# Patient Record
Sex: FEMALE | Race: WHITE | NOT HISPANIC OR LATINO | Employment: FULL TIME | URBAN - METROPOLITAN AREA
[De-identification: names, ages, dates, MRNs, and addresses within clinical notes are randomized per-mention and may not be internally consistent; named-entity substitution may affect disease eponyms.]

---

## 2017-06-01 ENCOUNTER — PATIENT MESSAGE (OUTPATIENT)
Dept: OBSTETRICS AND GYNECOLOGY | Facility: CLINIC | Age: 32
End: 2017-06-01

## 2017-06-01 DIAGNOSIS — Z36.89 ESTABLISH GESTATIONAL AGE, ULTRASOUND: Primary | ICD-10-CM

## 2017-06-05 ENCOUNTER — PATIENT MESSAGE (OUTPATIENT)
Dept: OBSTETRICS AND GYNECOLOGY | Facility: CLINIC | Age: 32
End: 2017-06-05

## 2017-06-13 ENCOUNTER — TELEPHONE (OUTPATIENT)
Dept: OBSTETRICS AND GYNECOLOGY | Facility: CLINIC | Age: 32
End: 2017-06-13

## 2017-06-13 NOTE — TELEPHONE ENCOUNTER
----- Message from Enrique Ojeda MA sent at 6/6/2017  1:42 PM CDT -----  Regarding: GYNUS 7/3/2017  SCHEDULE PT FOR GYNUS FIRST WEEK OF July. PT HAS APPT 7-3-17.

## 2017-06-15 ENCOUNTER — PATIENT MESSAGE (OUTPATIENT)
Dept: OBSTETRICS AND GYNECOLOGY | Facility: CLINIC | Age: 32
End: 2017-06-15

## 2017-06-16 ENCOUNTER — TELEPHONE (OUTPATIENT)
Dept: OBSTETRICS AND GYNECOLOGY | Facility: CLINIC | Age: 32
End: 2017-06-16

## 2017-06-16 ENCOUNTER — PATIENT MESSAGE (OUTPATIENT)
Dept: INTERNAL MEDICINE | Facility: CLINIC | Age: 32
End: 2017-06-16

## 2017-06-16 DIAGNOSIS — R51.9 HEADACHE, UNSPECIFIED HEADACHE TYPE: Primary | ICD-10-CM

## 2017-06-16 RX ORDER — BUTALBITAL, ACETAMINOPHEN AND CAFFEINE 50; 325; 40 MG/1; MG/1; MG/1
1 TABLET ORAL EVERY 4 HOURS PRN
Qty: 30 TABLET | Refills: 0 | Status: SHIPPED | OUTPATIENT
Start: 2017-06-16 | End: 2017-07-16

## 2017-06-16 RX ORDER — BUTALBITAL, ACETAMINOPHEN AND CAFFEINE 50; 325; 40 MG/1; MG/1; MG/1
1 TABLET ORAL EVERY 4 HOURS PRN
Qty: 30 TABLET | Refills: 3 | Status: SHIPPED | OUTPATIENT
Start: 2017-06-16 | End: 2017-07-05 | Stop reason: SDUPTHER

## 2017-06-16 NOTE — TELEPHONE ENCOUNTER
please review message below!  Pt requesting Fioricet be sent to her pharmacy. NEWLY pregnant - has not been seen for this pregnancy as of yet: 6 weeks today    yes i dont care who the appointment is with or what day or what location. i just need to get in the latest time of day possible. is there anyway you could see if she could write me for fioricet for headaches. i had to get it last time i was pregnant and i already feel them coming on.    thank you so much. i owe you big time.  jose c

## 2017-07-05 ENCOUNTER — LAB VISIT (OUTPATIENT)
Dept: LAB | Facility: OTHER | Age: 32
End: 2017-07-05
Attending: ADVANCED PRACTICE MIDWIFE
Payer: COMMERCIAL

## 2017-07-05 ENCOUNTER — OFFICE VISIT (OUTPATIENT)
Dept: OBSTETRICS AND GYNECOLOGY | Facility: CLINIC | Age: 32
End: 2017-07-05
Payer: COMMERCIAL

## 2017-07-05 ENCOUNTER — PROCEDURE VISIT (OUTPATIENT)
Dept: OBSTETRICS AND GYNECOLOGY | Facility: CLINIC | Age: 32
End: 2017-07-05
Payer: COMMERCIAL

## 2017-07-05 VITALS
DIASTOLIC BLOOD PRESSURE: 62 MMHG | BODY MASS INDEX: 22.79 KG/M2 | HEIGHT: 68 IN | WEIGHT: 150.38 LBS | SYSTOLIC BLOOD PRESSURE: 104 MMHG

## 2017-07-05 DIAGNOSIS — N91.5 OLIGOMENORRHEA: Primary | ICD-10-CM

## 2017-07-05 DIAGNOSIS — Z36.89 ESTABLISH GESTATIONAL AGE, ULTRASOUND: ICD-10-CM

## 2017-07-05 DIAGNOSIS — N91.5 OLIGOMENORRHEA: ICD-10-CM

## 2017-07-05 DIAGNOSIS — N91.2 AMENORRHEA: ICD-10-CM

## 2017-07-05 DIAGNOSIS — R51.9 HEADACHE, UNSPECIFIED HEADACHE TYPE: ICD-10-CM

## 2017-07-05 DIAGNOSIS — Z12.4 PAP SMEAR FOR CERVICAL CANCER SCREENING: ICD-10-CM

## 2017-07-05 DIAGNOSIS — Z11.51 SCREENING FOR HUMAN PAPILLOMAVIRUS (HPV): ICD-10-CM

## 2017-07-05 LAB
ABO + RH BLD: NORMAL
BASOPHILS # BLD AUTO: 0.01 K/UL
BASOPHILS NFR BLD: 0.1 %
BLD GP AB SCN CELLS X3 SERPL QL: NORMAL
C TRACH DNA SPEC QL NAA+PROBE: NOT DETECTED
DIFFERENTIAL METHOD: ABNORMAL
EOSINOPHIL # BLD AUTO: 0 K/UL
EOSINOPHIL NFR BLD: 0.4 %
ERYTHROCYTE [DISTWIDTH] IN BLOOD BY AUTOMATED COUNT: 13.7 %
HCT VFR BLD AUTO: 38.1 %
HGB BLD-MCNC: 12.8 G/DL
LYMPHOCYTES # BLD AUTO: 2.3 K/UL
LYMPHOCYTES NFR BLD: 27.3 %
MCH RBC QN AUTO: 29.4 PG
MCHC RBC AUTO-ENTMCNC: 33.6 %
MCV RBC AUTO: 88 FL
MONOCYTES # BLD AUTO: 0.7 K/UL
MONOCYTES NFR BLD: 8.3 %
N GONORRHOEA DNA SPEC QL NAA+PROBE: NOT DETECTED
NEUTROPHILS # BLD AUTO: 5.4 K/UL
NEUTROPHILS NFR BLD: 63.5 %
PLATELET # BLD AUTO: 265 K/UL
PMV BLD AUTO: 9 FL
RBC # BLD AUTO: 4.35 M/UL
WBC # BLD AUTO: 8.53 K/UL

## 2017-07-05 PROCEDURE — 87591 N.GONORRHOEAE DNA AMP PROB: CPT

## 2017-07-05 PROCEDURE — 36415 COLL VENOUS BLD VENIPUNCTURE: CPT

## 2017-07-05 PROCEDURE — 87624 HPV HI-RISK TYP POOLED RSLT: CPT

## 2017-07-05 PROCEDURE — 81220 CFTR GENE COM VARIANTS: CPT

## 2017-07-05 PROCEDURE — 99214 OFFICE O/P EST MOD 30 MIN: CPT | Mod: S$PBB,,, | Performed by: NURSE PRACTITIONER

## 2017-07-05 PROCEDURE — 86850 RBC ANTIBODY SCREEN: CPT

## 2017-07-05 PROCEDURE — 86900 BLOOD TYPING SEROLOGIC ABO: CPT

## 2017-07-05 PROCEDURE — 76817 TRANSVAGINAL US OBSTETRIC: CPT | Mod: PBBFAC | Performed by: OBSTETRICS & GYNECOLOGY

## 2017-07-05 PROCEDURE — 86762 RUBELLA ANTIBODY: CPT

## 2017-07-05 PROCEDURE — 86703 HIV-1/HIV-2 1 RESULT ANTBDY: CPT

## 2017-07-05 PROCEDURE — 85025 COMPLETE CBC W/AUTO DIFF WBC: CPT

## 2017-07-05 PROCEDURE — 88175 CYTOPATH C/V AUTO FLUID REDO: CPT

## 2017-07-05 PROCEDURE — 86592 SYPHILIS TEST NON-TREP QUAL: CPT

## 2017-07-05 PROCEDURE — 99999 PR PBB SHADOW E&M-EST. PATIENT-LVL III: CPT | Mod: PBBFAC,,, | Performed by: NURSE PRACTITIONER

## 2017-07-05 PROCEDURE — 87086 URINE CULTURE/COLONY COUNT: CPT

## 2017-07-05 PROCEDURE — 87340 HEPATITIS B SURFACE AG IA: CPT

## 2017-07-05 PROCEDURE — 76817 TRANSVAGINAL US OBSTETRIC: CPT | Mod: 26,S$PBB,, | Performed by: OBSTETRICS & GYNECOLOGY

## 2017-07-05 PROCEDURE — 99213 OFFICE O/P EST LOW 20 MIN: CPT | Mod: PBBFAC | Performed by: NURSE PRACTITIONER

## 2017-07-05 RX ORDER — BUTALBITAL, ACETAMINOPHEN AND CAFFEINE 50; 325; 40 MG/1; MG/1; MG/1
1 TABLET ORAL EVERY 4 HOURS PRN
Qty: 30 TABLET | Refills: 0 | Status: SHIPPED | OUTPATIENT
Start: 2017-07-05 | End: 2017-08-04

## 2017-07-05 NOTE — PROCEDURES
Procedures   Obstetrical ultrasound completed today.  See report in imaging section of [facility].

## 2017-07-05 NOTE — PROGRESS NOTES
"  CC: Positive Pregnancy Test    HISTORY OF PRESENT ILLNESS:    Jodi Daniel is a 31 y.o. female, ,  Presents today for a routine exam complaining of amenorrhea and positive home urine pregnancy test.  Patient's last menstrual period was 2017 (exact date).   She is not currently on any contraception.  Denies nausea. Reports breast tenderness. Denies vaginal bleeding and pelvic pain.  Reports HAs- uses Fioricet PRN.   Prior  X1- full term/ uncomplicated pregnancy.  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        ROS:  GENERAL: No weight changes. No swelling. No fatigue. No fever.  CARDIOVASCULAR: No chest pain. No shortness of breath. No leg cramps.   NEUROLOGICAL: No headaches. No vision changes.  BREASTS: No pain. No lumps. No discharge.  ABDOMEN: No pain. No diarrhea. No constipation.  REPRODUCTIVE: No abnormal bleeding.   VULVA: No pain. No lesions. No itching.  VAGINA: No relaxation. No itching. No odor. No discharge. No lesions.  URINARY: No incontinence. No nocturia. No frequency. No dysuria.    MEDICATIONS AND ALLERGIES:  Reviewed        COMPREHENSIVE GYN HISTORY:  PAP History: Denies abnormal Paps.  Infection History: Denies STDs. Denies PID.  Benign History: Denies uterine fibroids. Denies ovarian cysts. Denies endometriosis. Denies other conditions.  Cancer History: Denies cervical cancer. Denies uterine cancer or hyperplasia. Denies ovarian cancer. Denies vulvar cancer or pre-cancer. Denies vaginal cancer or pre-cancer. Denies breast cancer. Denies colon cancer.  Sexual Activity History: Reports currently being sexually active  Menstrual History: None.  Contraception: None    /62   Ht 5' 8" (1.727 m)   Wt 68.2 kg (150 lb 5.7 oz)   LMP 2017 (Exact Date)   BMI 22.86 kg/m²     PE:  AFFECT: Calm, alert and oriented X 3. Interactive during exam  GENERAL: Appears well-nourished, well-developed, in no acute distress.  HEAD: Normocephalic, atruamatic  TEETH: Good dentition.  THYROID: No " thyromegally   BREASTS: No masses, skin changes, nipple discharge or adenopathy bilaterally.  SKIN: Normal for race, warm, & dry. No lesions or rashes.  LUNGS: Easy and unlabored, clear to auscultation bilaterally.  HEART: Regular rate and rhythm   ABDOMEN: Soft and nontender without masses or organomegally.  VULVA: No lesions, masses or tenderness.  VAGINA: Moist and well rugated without lesions or discharge.  CERVIX: Moist and pink without lesions, discharge or tenderness.      UTERUS SIZE: 8 week size, nontender and without masses.  ADNEXA: No masses or tenderness.  ESTIMATE OF PELVIC CAPACITY: Adequate  EXTREMITIES: No cyanosis, clubbing or edema. No calf tenderness.  LYMPH NODES: No axillary or inguinal adenopathy.    PROCEDURES:  UPT Positive  Genprobe  Pap      ASSESSMENT/PLAN:  Amenorrhea  Positive urine pregnancy test (ARIANNE: 18, EGA: 8w6d based on LMP)    -  Routine prenatal care    Nausea and vomiting in pregnancy    -  Education regarding lifestyle and dietary modifications    -  Advised use of B6/Unisom. Pt will notify us if no relief/worsening symptoms, will consider Zofran if needed.      1st TRIMESTER COUNSELING: Discussed all, booklet provided:  Common complaints of pregnancy  HIV and other routine prenatal tests including  genetic screening  Risk factors identified by prenatal history  Oriented to practice - discussed anticipated course of prenatal care & indications for Ultrasound  Childbirth classes/Hospital facilities   Nutrition and weight gain counseling  Toxoplasmosis precautions (Cats/Raw Meat)  Sexual activity and exercise  Environmental/Work hazards  Travel  Tobacco (Ask, Advise, Assess, Assist, and Arrange), as well as alcohol and drug use  Use of any medications (Including supplements, Vitamins, Herbs, or OTC Drugs)  Domestic violence  Seat belt use      TERATOLOGY COUNSELING:   Discussed indications and options for aneuploidy screening - pamphlets given    -  Pt declines  testing  Dating US later today   FOLLOW-UP in 4 weeks with Dr. Katie Weiss, NP    OB/GYN

## 2017-07-06 LAB
BACTERIA UR CULT: NO GROWTH
HBV SURFACE AG SERPL QL IA: NEGATIVE
HIV 1+2 AB+HIV1 P24 AG SERPL QL IA: NEGATIVE
RPR SER QL: NORMAL

## 2017-07-07 ENCOUNTER — PATIENT MESSAGE (OUTPATIENT)
Dept: OBSTETRICS AND GYNECOLOGY | Facility: CLINIC | Age: 32
End: 2017-07-07

## 2017-07-07 LAB
RUBV IGG SER-ACNC: 42.7 IU/ML
RUBV IGG SER-IMP: REACTIVE

## 2017-07-11 LAB
HPV HR 12 DNA CVX QL NAA+PROBE: NEGATIVE
HPV16 DNA SPEC QL NAA+PROBE: NEGATIVE
HPV18 DNA SPEC QL NAA+PROBE: NEGATIVE

## 2017-07-14 LAB — CFTR MUT ANL BLD/T: NORMAL

## 2017-08-01 ENCOUNTER — ROUTINE PRENATAL (OUTPATIENT)
Dept: OBSTETRICS AND GYNECOLOGY | Facility: CLINIC | Age: 32
End: 2017-08-01
Payer: COMMERCIAL

## 2017-08-01 VITALS — SYSTOLIC BLOOD PRESSURE: 100 MMHG | BODY MASS INDEX: 24 KG/M2 | WEIGHT: 157.88 LBS | DIASTOLIC BLOOD PRESSURE: 66 MMHG

## 2017-08-01 DIAGNOSIS — Z3A.12 12 WEEKS GESTATION OF PREGNANCY: Primary | ICD-10-CM

## 2017-08-01 PROCEDURE — 99999 PR PBB SHADOW E&M-EST. PATIENT-LVL III: CPT | Mod: PBBFAC,,, | Performed by: OBSTETRICS & GYNECOLOGY

## 2017-08-01 PROCEDURE — 0502F SUBSEQUENT PRENATAL CARE: CPT | Mod: S$GLB,,, | Performed by: OBSTETRICS & GYNECOLOGY

## 2017-08-01 NOTE — PROGRESS NOTES
HERE for routine OB visit at 12 5/7 wks, with NO complaints.  Denies vaginal bleeding, cramping.

## 2017-08-08 ENCOUNTER — PATIENT MESSAGE (OUTPATIENT)
Dept: OBSTETRICS AND GYNECOLOGY | Facility: CLINIC | Age: 32
End: 2017-08-08

## 2017-08-08 ENCOUNTER — TELEPHONE (OUTPATIENT)
Dept: OBSTETRICS AND GYNECOLOGY | Facility: CLINIC | Age: 32
End: 2017-08-08

## 2017-08-08 DIAGNOSIS — Z3A.13 13 WEEKS GESTATION OF PREGNANCY: Primary | ICD-10-CM

## 2017-08-08 NOTE — TELEPHONE ENCOUNTER
please place order for anatomy scan:    Do you know when they are going to call me for the anatomy scan? Thank you! Just have to tell work asap

## 2017-08-31 ENCOUNTER — ROUTINE PRENATAL (OUTPATIENT)
Dept: OBSTETRICS AND GYNECOLOGY | Facility: CLINIC | Age: 32
End: 2017-08-31
Payer: COMMERCIAL

## 2017-08-31 VITALS — BODY MASS INDEX: 24.7 KG/M2 | DIASTOLIC BLOOD PRESSURE: 70 MMHG | SYSTOLIC BLOOD PRESSURE: 120 MMHG | WEIGHT: 162.5 LBS

## 2017-08-31 DIAGNOSIS — Z3A.17 17 WEEKS GESTATION OF PREGNANCY: Primary | ICD-10-CM

## 2017-08-31 PROCEDURE — 0502F SUBSEQUENT PRENATAL CARE: CPT | Mod: S$GLB,,, | Performed by: OBSTETRICS & GYNECOLOGY

## 2017-08-31 PROCEDURE — 99999 PR PBB SHADOW E&M-EST. PATIENT-LVL III: CPT | Mod: PBBFAC,,, | Performed by: OBSTETRICS & GYNECOLOGY

## 2017-08-31 RX ORDER — BUTALBITAL, ACETAMINOPHEN AND CAFFEINE 50; 325; 40 MG/1; MG/1; MG/1
TABLET ORAL
COMMUNITY
Start: 2017-08-30 | End: 2018-04-03

## 2017-08-31 NOTE — PROGRESS NOTES
HERE for routine OB visit at 17 0/7 wks, with NO complaints.  Denies vaginal bleeding, cramping.  F/U in four weeks.  OB screen with next visit.

## 2017-09-07 ENCOUNTER — TELEPHONE (OUTPATIENT)
Dept: OBSTETRICS AND GYNECOLOGY | Facility: CLINIC | Age: 32
End: 2017-09-07

## 2017-09-07 NOTE — TELEPHONE ENCOUNTER
----- Message from Celeste Frazier MA sent at 8/31/2017  3:40 PM CDT -----  SCHEDULE LEATHA FOR OCT 19 WITH GLUCOSE!!!

## 2017-09-07 NOTE — TELEPHONE ENCOUNTER
Called patient. No answer. Left voice message to call office back.     Patient needs LEATHA scheduled for 10/19/2017

## 2017-09-08 ENCOUNTER — PATIENT MESSAGE (OUTPATIENT)
Dept: OBSTETRICS AND GYNECOLOGY | Facility: CLINIC | Age: 32
End: 2017-09-08

## 2017-09-18 ENCOUNTER — PATIENT MESSAGE (OUTPATIENT)
Dept: OBSTETRICS AND GYNECOLOGY | Facility: CLINIC | Age: 32
End: 2017-09-18

## 2017-09-26 ENCOUNTER — OFFICE VISIT (OUTPATIENT)
Dept: MATERNAL FETAL MEDICINE | Facility: CLINIC | Age: 32
End: 2017-09-26
Payer: COMMERCIAL

## 2017-09-26 DIAGNOSIS — Z36.89 ENCOUNTER FOR ULTRASOUND TO CHECK FETAL GROWTH: Primary | ICD-10-CM

## 2017-09-26 DIAGNOSIS — O28.3 ECHOGENIC INTRACARDIAC FOCUS OF FETUS ON PRENATAL ULTRASOUND: ICD-10-CM

## 2017-09-26 DIAGNOSIS — Z36.89 ENCOUNTER FOR FETAL ANATOMIC SURVEY: ICD-10-CM

## 2017-09-26 DIAGNOSIS — Z3A.13 13 WEEKS GESTATION OF PREGNANCY: ICD-10-CM

## 2017-09-26 PROCEDURE — 76811 OB US DETAILED SNGL FETUS: CPT | Mod: S$GLB,,, | Performed by: OBSTETRICS & GYNECOLOGY

## 2017-09-26 PROCEDURE — 99241 PR OFFICE CONSULTATION,LEVEL I: CPT | Mod: 25,S$GLB,, | Performed by: OBSTETRICS & GYNECOLOGY

## 2017-09-26 NOTE — PROGRESS NOTES
OB Ultrasound Report (see PDF version under imaging tab) and consultation    Indication  ========    Fetal anatomy survey.    History  ======    General History  Other: No screening  Previous Outcomes  Preg. no. 1  Outcome: Live YOB: 2015  Gest. age 41 w + 0 d  Gender: female  Details: vaginal delivery 8lb 15ox   2  Para 1  [facility] children born living (T) 1  [facility] children born (T) 1  Dumont living children (L) 1    Method  ======    Transabdominal ultrasound examination.    Pregnancy  =========    [facility] pregnancy. Number of fetuses: 1.    Dating  ======    Cycle: regular cycle  Ultrasound examination on: 2017  GA by U/S based upon: AC, BPD, Femur, HC  GA by U/S 21 w + 5 d  ARIANNE by U/S: 2018  Assigned: Dating performed on 2017, based on the LMP  Assigned GA 20 w + 5 d  Assigned ARIANNE: 2018    General Evaluation  ===============    Cardiac activity: present.  bpm.  Fetal movements: visualized.  Presentation: cephalic.  Placenta: anterior.  Umbilical cord: normal, 3 vessel cord.  Amniotic fluid: normal amount.    Fetal Biometry  ===========    Fetal Biometry  BPD 52.0 mm 21w 5d Hadlock  OFD 67.8 mm 22w 5d Sanchez  .3 mm 21w 3d Hadlock  .4 mm 22w 0d Hadlock  Femur 37.1 mm 21w 6d Hadlock  Cerebellum tr 21.2 mm 20w 6d Lion  CM 6.5 mm  Nuchal fold 3.99 mm  Humerus 34.6 mm 21w 6d Sanchez   g 54% Iván  Calculated by: Hadlock (BPD-HC-AC-FL)  EFW (lb) 1 lb  EFW (oz) 0 oz  Cephalic index 0.77  HC / AC 1.13  FL / BPD 0.71  FL / AC 0.22   bpm  Head / Face / Neck   6.4 mm    Fetal Anatomy  ===========    Cranium: normal  Lateral ventricles: normal  Choroid plexus: normal  Midline falx: normal  Cavum septi pellucidi: normal  Cerebellum: normal  Cisterna magna: normal  Rt lateral ventricle: normal  Lt lateral ventricle: normal  Rt choroid plexus: normal  Lt choroid plexus: normal  Lips: normal  Profile: normal  Nose: normal  4-chamber  view: suboptimal  RVOT: normal  LVOT: normal  4-chamber view: septum normal, echogenic focus  Situs: normal  Aortic arch: normal  Ductal arch: suboptimal  SVC: normal  IVC: normal  3-vessel view: normal  3-vessel-trachea view: normal  Cardiac position: normal  Cardiac axis: normal  Cardiac size: normal  Cardiac rhythm: normal  Rt lung: normal  Lt lung: normal  Diaphragm: normal  Cord insertion: normal  Stomach: normal  Kidneys: normal  Bladder: normal  Genitals: normal  Abdom. wall: appears normal  Abdom. cavity: normal  Rt kidney: normal  Lt kidney: normal  Cervical spine: normal  Thoracic spine: normal  Lumbar spine: normal  Sacral spine: normal  Arms: normal  Legs: normal  Rt arm: normal  Lt arm: normal  Rt hand: present  Lt hand: present  Rt leg: normal  Lt leg: normal  Rt foot: normal  Lt foot: normal  Gender: female  Wants to know gender: yes    Maternal Structures  ===============    Uterus / Cervix  Uterus: Normal  Cervix: Visualized  Approach: Transabdominal  Cervical length 54.9 mm  Ovaries / Tubes / Adnexa  Rt ovary: Not visualized  Lt ovary: Not visualized    Consultation  ==========    Type: Abnormal Ultrasound Finding.  I spent 15 minutes on the consultation today with the patient and her spouse regarding findings from today's ultrasound exam. More  than 50% of the consultation was spent in face-to-face counseling and coordination of care.  We discussed the significance of an echogenic focus in the ventricle of the fetal heart. This is not a true structural abnormality per se  and is not associated with congenital heart disease. It is usually seen as a normal variant in about 5% of pregnancies. Down  syndrome fetuses have a higher incidence of echogenic foci than normal fetuses, therefore it is considered to be risk factor for Down  syndrome.  In a woman with other risk factors for Down syndrome (advanced maternal age, elevated quad screen risk or presence of other  markers), the presence of an  echogenic focus may increases the relative risk of Down syndrome. If the patient is close to age 35, the  presence of an echogenic focus may increase the risk for Down syndrome to a level seen in women 35 years old or older. In a  younger woman (< age 35) or in a woman without other risk factors or markers for Down syndrome, the significance of an isolated  echogenic focus is uncertain and often not significant. The overwhelming majority (99%) of these fetuses will not have Down  syndrome.  The patient has not undergone any screening for Down Syndrome thus far, so we discussed screening options available at this point  (cell-free DNA testing and quad screen). We also briefly reviewed the option of genetic amniocentesis for definitive cytogenetic  diagnosis. The patient was not interested in amniocentesis. She indicated that she would like to pursue screening. She will call  [facility] for an estimate of maximum out-of-pocket cost for cell-free DNA testing. If this cost is too high, she will pursue a quad  screen.    Impression  =========    A detailed fetal anatomic ultrasound examination was performed for the following high risk indication: echogenic intracardiac focus  (EIF) on today's exam. No other sonographic markers associated with Down Syndrome are seen. No fetal structural malformations are  identified; however, fetal cardiac imaging is incomplete today. A follow-up study will be scheduled to complete the fetal anatomic  survey. Fetal size today is consistent with established gestational age. Cervical length is normal. Placental location is normal without  evidence of previa.

## 2017-10-02 ENCOUNTER — TELEPHONE (OUTPATIENT)
Dept: MATERNAL FETAL MEDICINE | Facility: CLINIC | Age: 32
End: 2017-10-02

## 2017-10-02 NOTE — TELEPHONE ENCOUNTER
Patient has been notified of NEGATIVE [id number] results. This specimen showed an expected representation of chromosomes 13, 18 and 21 material consistent with a FEMALE fetus.    Pt verbalized understanding of information.

## 2017-10-03 ENCOUNTER — PATIENT MESSAGE (OUTPATIENT)
Dept: ADMINISTRATIVE | Facility: OTHER | Age: 32
End: 2017-10-03

## 2017-10-17 ENCOUNTER — TELEPHONE (OUTPATIENT)
Dept: OBSTETRICS AND GYNECOLOGY | Facility: CLINIC | Age: 32
End: 2017-10-17

## 2017-10-17 NOTE — TELEPHONE ENCOUNTER
Called patient to reschedule her appointment. Patient stated that she would call tomorrow to let me know whether to reschedule or if she can come in early.

## 2017-10-18 ENCOUNTER — PATIENT MESSAGE (OUTPATIENT)
Dept: OBSTETRICS AND GYNECOLOGY | Facility: CLINIC | Age: 32
End: 2017-10-18

## 2017-10-20 ENCOUNTER — ROUTINE PRENATAL (OUTPATIENT)
Dept: OBSTETRICS AND GYNECOLOGY | Facility: CLINIC | Age: 32
End: 2017-10-20
Payer: COMMERCIAL

## 2017-10-20 ENCOUNTER — LAB VISIT (OUTPATIENT)
Dept: LAB | Facility: OTHER | Age: 32
End: 2017-10-20
Attending: OBSTETRICS & GYNECOLOGY
Payer: COMMERCIAL

## 2017-10-20 VITALS
DIASTOLIC BLOOD PRESSURE: 58 MMHG | WEIGHT: 171.94 LBS | SYSTOLIC BLOOD PRESSURE: 116 MMHG | BODY MASS INDEX: 26.15 KG/M2

## 2017-10-20 DIAGNOSIS — Z3A.17 17 WEEKS GESTATION OF PREGNANCY: ICD-10-CM

## 2017-10-20 DIAGNOSIS — Z34.82 ENCOUNTER FOR SUPERVISION OF OTHER NORMAL PREGNANCY IN SECOND TRIMESTER: Primary | ICD-10-CM

## 2017-10-20 LAB
BASOPHILS # BLD AUTO: 0.01 K/UL
BASOPHILS NFR BLD: 0.1 %
DIFFERENTIAL METHOD: ABNORMAL
EOSINOPHIL # BLD AUTO: 0.1 K/UL
EOSINOPHIL NFR BLD: 1.2 %
ERYTHROCYTE [DISTWIDTH] IN BLOOD BY AUTOMATED COUNT: 13.5 %
GLUCOSE SERPL-MCNC: 100 MG/DL
HCT VFR BLD AUTO: 32.5 %
HGB BLD-MCNC: 10.6 G/DL
LYMPHOCYTES # BLD AUTO: 1.9 K/UL
LYMPHOCYTES NFR BLD: 22.3 %
MCH RBC QN AUTO: 29.3 PG
MCHC RBC AUTO-ENTMCNC: 32.6 G/DL
MCV RBC AUTO: 90 FL
MONOCYTES # BLD AUTO: 0.5 K/UL
MONOCYTES NFR BLD: 6.4 %
NEUTROPHILS # BLD AUTO: 5.8 K/UL
NEUTROPHILS NFR BLD: 69.6 %
PLATELET # BLD AUTO: 273 K/UL
PMV BLD AUTO: 9.5 FL
RBC # BLD AUTO: 3.62 M/UL
WBC # BLD AUTO: 8.31 K/UL

## 2017-10-20 PROCEDURE — 99999 PR PBB SHADOW E&M-EST. PATIENT-LVL II: CPT | Mod: PBBFAC,,, | Performed by: ADVANCED PRACTICE MIDWIFE

## 2017-10-20 PROCEDURE — 82950 GLUCOSE TEST: CPT

## 2017-10-20 PROCEDURE — 0502F SUBSEQUENT PRENATAL CARE: CPT | Mod: S$GLB,,, | Performed by: ADVANCED PRACTICE MIDWIFE

## 2017-10-20 PROCEDURE — 36415 COLL VENOUS BLD VENIPUNCTURE: CPT

## 2017-10-20 PROCEDURE — 85025 COMPLETE CBC W/AUTO DIFF WBC: CPT

## 2017-10-25 ENCOUNTER — PATIENT MESSAGE (OUTPATIENT)
Dept: OBSTETRICS AND GYNECOLOGY | Facility: CLINIC | Age: 32
End: 2017-10-25

## 2017-11-01 ENCOUNTER — OFFICE VISIT (OUTPATIENT)
Dept: MATERNAL FETAL MEDICINE | Facility: CLINIC | Age: 32
End: 2017-11-01
Payer: COMMERCIAL

## 2017-11-01 DIAGNOSIS — O28.3 ECHOGENIC INTRACARDIAC FOCUS OF FETUS ON PRENATAL ULTRASOUND: ICD-10-CM

## 2017-11-01 DIAGNOSIS — Z36.89 ENCOUNTER FOR ULTRASOUND TO CHECK FETAL GROWTH: ICD-10-CM

## 2017-11-01 PROCEDURE — 99499 UNLISTED E&M SERVICE: CPT | Mod: S$GLB,,, | Performed by: OBSTETRICS & GYNECOLOGY

## 2017-11-01 PROCEDURE — 76816 OB US FOLLOW-UP PER FETUS: CPT | Mod: S$GLB,,, | Performed by: OBSTETRICS & GYNECOLOGY

## 2017-11-01 NOTE — PROGRESS NOTES
OB Ultrasound Report (see PDF version under imaging tab)    Indication  ========    Growth, Follow-up evaluation of anatomy.    History  ======    General History  Other: No screening  Previous Outcomes  Preg. no. 1  Outcome: Live YOB: 2015  Gest. age 41 w + 0 d  Gender: female  Details: vaginal delivery 8lb 15ox   2  Para 1  D[facility]ont children born living (T) 1  [facility]ont children born (T) 1  Dumont living children (L) 1    Pregnancy History  ===============    Maternal Lab Tests  Test: [id number]  Date: 2017  Result: Negative  Wants to know gender: yes    Method  ======    Transabdominal ultrasound examination, [place] E10. View: Good view.    Pregnancy  =========    D[facility]ont pregnancy. Number of fetuses: 1.    Dating  ======    Cycle: regular cycle  Ultrasound examination on: 2017  GA by U/S based upon: AC, BPD, Femur, HC  GA by U/S 27 w + 5 d  ARIANNE by U/S: 2018  Assigned: Dating performed on 2017, based on the LMP  Assigned GA 25 w + 6 d  Assigned ARIANNE: 2018    General Evaluation  ===============    Cardiac activity: present.  bpm.  Fetal movements: visualized.  Presentation: cephalic.  Placenta: anterior.  Umbilical cord: 3 vessel cord.  Amniotic fluid: Amount of AF: normal amount. MVP 4.4 cm.    Fetal Biometry  ===========    Fetal Biometry  BPD 69.5 mm 28w 0d Hadlock  OFD 88.1 mm 28w 3d Sanchez  .0 mm 27w 3d Hadlock  .1 mm 28w 0d Hadlock  Femur 51.8 mm 27w 5d Hadlock  EFW 1,134 g 76% Iván  Calculated by: Hadlock (BPD-HC-AC-FL)  EFW (lb) 2 lb  EFW (oz) 8 oz  Cephalic index 0.79  HC / AC 1.07  FL / BPD 0.75  FL / AC 0.22  MVP 4.4 cm   bpm    Fetal Anatomy  ===========    Cranium: normal  4-chamber view: normal  Ductal arch: normal  Stomach: normal  Kidneys: normal  Bladder: normal  Gender: female  Wants to know gender: yes    Impression  =========    A follow up fetal anatomical ultrasound was completed today.  The fetal anatomic survey  was completed today, and no fetal structural abnormalities were noted. Interval fetal growth has been  normal, and the AFV is normal.  F/U as clinically indicated.

## 2017-11-16 ENCOUNTER — TELEPHONE (OUTPATIENT)
Dept: OBSTETRICS AND GYNECOLOGY | Facility: CLINIC | Age: 32
End: 2017-11-16

## 2017-11-16 ENCOUNTER — ROUTINE PRENATAL (OUTPATIENT)
Dept: OBSTETRICS AND GYNECOLOGY | Facility: CLINIC | Age: 32
End: 2017-11-16
Payer: COMMERCIAL

## 2017-11-16 VITALS
SYSTOLIC BLOOD PRESSURE: 118 MMHG | DIASTOLIC BLOOD PRESSURE: 76 MMHG | WEIGHT: 175.06 LBS | BODY MASS INDEX: 26.62 KG/M2

## 2017-11-16 DIAGNOSIS — Z3A.28 28 WEEKS GESTATION OF PREGNANCY: Primary | ICD-10-CM

## 2017-11-16 PROCEDURE — 0502F SUBSEQUENT PRENATAL CARE: CPT | Mod: S$GLB,,, | Performed by: OBSTETRICS & GYNECOLOGY

## 2017-11-16 PROCEDURE — 90686 IIV4 VACC NO PRSV 0.5 ML IM: CPT | Mod: S$GLB,,, | Performed by: OBSTETRICS & GYNECOLOGY

## 2017-11-16 PROCEDURE — 99999 PR PBB SHADOW E&M-EST. PATIENT-LVL III: CPT | Mod: PBBFAC,,, | Performed by: OBSTETRICS & GYNECOLOGY

## 2017-11-16 PROCEDURE — 90715 TDAP VACCINE 7 YRS/> IM: CPT | Mod: S$GLB,,, | Performed by: OBSTETRICS & GYNECOLOGY

## 2017-11-16 PROCEDURE — 90471 IMMUNIZATION ADMIN: CPT | Mod: S$GLB,,, | Performed by: OBSTETRICS & GYNECOLOGY

## 2017-11-16 PROCEDURE — 90472 IMMUNIZATION ADMIN EACH ADD: CPT | Mod: S$GLB,,, | Performed by: OBSTETRICS & GYNECOLOGY

## 2017-11-16 NOTE — PROGRESS NOTES
HERE for routine OB visit at 28 0/7 wks, with NO complaints.  Denies vaginal bleeding, cramping/ ctx, or LOF.  + FM.  FMC BID.  Tdap and flu vaccine given.  F/U in two weeks.

## 2017-11-16 NOTE — PROGRESS NOTES
After obtaining consent, and per orders of Dr. Wilson, injection of tdap Lot [id number] 5/2/19 given in the LD by KVNG العلي. Patient tolerated well and band aid applied. Patient instructed to remain in clinic for 15 minutes afterwards, and to report any adverse reaction to me immediately.    After obtaining consent, and per orders of Dr. Wilson, injection of fluzone Lot [id number] 6/30/18 given in the RD by KVNG العلي. Patient tolerated well and band aid applied. Patient instructed to remain in clinic for 15 minutes afterwards, and to report any adverse reaction to me immediately.

## 2017-11-28 ENCOUNTER — PATIENT MESSAGE (OUTPATIENT)
Dept: OBSTETRICS AND GYNECOLOGY | Facility: CLINIC | Age: 32
End: 2017-11-28

## 2017-12-01 ENCOUNTER — ROUTINE PRENATAL (OUTPATIENT)
Dept: OBSTETRICS AND GYNECOLOGY | Facility: CLINIC | Age: 32
End: 2017-12-01
Payer: COMMERCIAL

## 2017-12-01 VITALS
BODY MASS INDEX: 27.12 KG/M2 | WEIGHT: 178.38 LBS | DIASTOLIC BLOOD PRESSURE: 60 MMHG | SYSTOLIC BLOOD PRESSURE: 110 MMHG

## 2017-12-01 DIAGNOSIS — Z3A.30 30 WEEKS GESTATION OF PREGNANCY: Primary | ICD-10-CM

## 2017-12-01 PROCEDURE — 99999 PR PBB SHADOW E&M-EST. PATIENT-LVL III: CPT | Mod: PBBFAC,,, | Performed by: OBSTETRICS & GYNECOLOGY

## 2017-12-01 PROCEDURE — 0502F SUBSEQUENT PRENATAL CARE: CPT | Mod: S$GLB,,, | Performed by: OBSTETRICS & GYNECOLOGY

## 2017-12-01 NOTE — PROGRESS NOTES
HERE for routine OB visit at 30 1/7 wks, with NO complaints.  Denies vaginal bleeding, cramping/ ctx, or LOF.  + FM.  FMC BID.  Wants to consider labor induction/augmentation.  PTL precautions given.  F/U in two weeks.

## 2017-12-15 ENCOUNTER — ROUTINE PRENATAL (OUTPATIENT)
Dept: OBSTETRICS AND GYNECOLOGY | Facility: CLINIC | Age: 32
End: 2017-12-15
Payer: COMMERCIAL

## 2017-12-15 VITALS
WEIGHT: 178.38 LBS | SYSTOLIC BLOOD PRESSURE: 100 MMHG | DIASTOLIC BLOOD PRESSURE: 60 MMHG | BODY MASS INDEX: 27.12 KG/M2

## 2017-12-15 DIAGNOSIS — Z3A.32 32 WEEKS GESTATION OF PREGNANCY: Primary | ICD-10-CM

## 2017-12-15 PROCEDURE — 99999 PR PBB SHADOW E&M-EST. PATIENT-LVL II: CPT | Mod: PBBFAC,,, | Performed by: OBSTETRICS & GYNECOLOGY

## 2017-12-15 PROCEDURE — 0502F SUBSEQUENT PRENATAL CARE: CPT | Mod: S$GLB,,, | Performed by: OBSTETRICS & GYNECOLOGY

## 2017-12-15 NOTE — PROGRESS NOTES
HERE for routine OB visit at 32 1/7 wks, with anemia.  Feeling weak at times.  Not taking iron- recommend OTC daily iron.  NO FE in PNV. Anemia precautions.   Denies vaginal bleeding, cramping/ ctx, or LOF.  + FM.  FMC BID.   F/U 2 wks.

## 2017-12-18 DIAGNOSIS — R51.9 HEADACHE, UNSPECIFIED HEADACHE TYPE: ICD-10-CM

## 2017-12-18 RX ORDER — BUTALBITAL, ACETAMINOPHEN AND CAFFEINE 50; 325; 40 MG/1; MG/1; MG/1
1 TABLET ORAL EVERY 4 HOURS PRN
Qty: 30 TABLET | Refills: 0 | Status: SHIPPED | OUTPATIENT
Start: 2017-12-18 | End: 2017-12-28

## 2017-12-18 NOTE — TELEPHONE ENCOUNTER
----- Message from Vonda Arciniega sent at 12/18/2017  2:21 PM CST -----      _  1st Request  _  2nd Request  _  3rd Request    Please refill the medication(s) listed below. Please call the patient when the prescription(s) is ready for  at this phone number            Medication #1 butalbital-acetaminophen-caffeine -40 mg (FIORICET, ESGIC) -40 mg per tablet    Medication #2      Preferred Pharmacy:

## 2017-12-28 ENCOUNTER — ROUTINE PRENATAL (OUTPATIENT)
Dept: OBSTETRICS AND GYNECOLOGY | Facility: CLINIC | Age: 32
End: 2017-12-28
Payer: COMMERCIAL

## 2017-12-28 VITALS
BODY MASS INDEX: 28.43 KG/M2 | SYSTOLIC BLOOD PRESSURE: 110 MMHG | DIASTOLIC BLOOD PRESSURE: 80 MMHG | WEIGHT: 186.94 LBS

## 2017-12-28 DIAGNOSIS — Z3A.34 34 WEEKS GESTATION OF PREGNANCY: Primary | ICD-10-CM

## 2017-12-28 PROCEDURE — 0502F SUBSEQUENT PRENATAL CARE: CPT | Mod: S$GLB,,, | Performed by: OBSTETRICS & GYNECOLOGY

## 2017-12-28 PROCEDURE — 99999 PR PBB SHADOW E&M-EST. PATIENT-LVL II: CPT | Mod: PBBFAC,,, | Performed by: OBSTETRICS & GYNECOLOGY

## 2017-12-30 ENCOUNTER — PATIENT MESSAGE (OUTPATIENT)
Dept: OBSTETRICS AND GYNECOLOGY | Facility: CLINIC | Age: 32
End: 2017-12-30

## 2018-01-01 NOTE — PROGRESS NOTES
HERE for routine OB visit at 34 0/7 wks, with NO complaints.  Denies vaginal bleeding, cramping/ ctx, or LOF.  + FM.  FMC BID.   PTL precautions.  F/U weekly.  c

## 2018-01-02 ENCOUNTER — PATIENT MESSAGE (OUTPATIENT)
Dept: OBSTETRICS AND GYNECOLOGY | Facility: CLINIC | Age: 33
End: 2018-01-02

## 2018-01-03 ENCOUNTER — TELEPHONE (OUTPATIENT)
Dept: OBSTETRICS AND GYNECOLOGY | Facility: CLINIC | Age: 33
End: 2018-01-03

## 2018-01-03 NOTE — TELEPHONE ENCOUNTER
----- Message from Saurabh Salas sent at 1/3/2018  8:47 AM CST -----  Contact: PT  x_ 1st Request  _ 2nd Request  _ 3rd Request    Who: FANG RENTERIA [[id number]]    Why: Patient would like to speak with staff in regards to getting her routine ob appointment schedule for this week    What Number to Call Back: [phone]    When to Expect a call back: (Before the end of the day)  -- if call after 3:00 call back will be tomorrow.

## 2018-01-10 ENCOUNTER — LAB VISIT (OUTPATIENT)
Dept: LAB | Facility: OTHER | Age: 33
End: 2018-01-10
Payer: COMMERCIAL

## 2018-01-10 ENCOUNTER — ROUTINE PRENATAL (OUTPATIENT)
Dept: OBSTETRICS AND GYNECOLOGY | Facility: CLINIC | Age: 33
End: 2018-01-10
Payer: COMMERCIAL

## 2018-01-10 VITALS
DIASTOLIC BLOOD PRESSURE: 64 MMHG | SYSTOLIC BLOOD PRESSURE: 118 MMHG | WEIGHT: 187.19 LBS | BODY MASS INDEX: 28.46 KG/M2

## 2018-01-10 DIAGNOSIS — Z34.80 SUPERVISION OF OTHER NORMAL PREGNANCY, ANTEPARTUM: ICD-10-CM

## 2018-01-10 DIAGNOSIS — Z34.80 SUPERVISION OF OTHER NORMAL PREGNANCY, ANTEPARTUM: Primary | ICD-10-CM

## 2018-01-10 DIAGNOSIS — O92.5 LACTATING, SUPPRESSED: ICD-10-CM

## 2018-01-10 LAB
BASOPHILS # BLD AUTO: 0.02 K/UL
BASOPHILS NFR BLD: 0.2 %
DIFFERENTIAL METHOD: ABNORMAL
EOSINOPHIL # BLD AUTO: 0.1 K/UL
EOSINOPHIL NFR BLD: 0.9 %
ERYTHROCYTE [DISTWIDTH] IN BLOOD BY AUTOMATED COUNT: 15.7 %
HCT VFR BLD AUTO: 30.8 %
HGB BLD-MCNC: 9.7 G/DL
LYMPHOCYTES # BLD AUTO: 2.6 K/UL
LYMPHOCYTES NFR BLD: 25.6 %
MCH RBC QN AUTO: 26.4 PG
MCHC RBC AUTO-ENTMCNC: 31.5 G/DL
MCV RBC AUTO: 84 FL
MONOCYTES # BLD AUTO: 0.8 K/UL
MONOCYTES NFR BLD: 7.5 %
NEUTROPHILS # BLD AUTO: 6.7 K/UL
NEUTROPHILS NFR BLD: 65 %
PLATELET # BLD AUTO: 252 K/UL
PMV BLD AUTO: 9.1 FL
RBC # BLD AUTO: 3.68 M/UL
WBC # BLD AUTO: 10.28 K/UL

## 2018-01-10 PROCEDURE — 0502F SUBSEQUENT PRENATAL CARE: CPT | Mod: S$GLB,,, | Performed by: NURSE PRACTITIONER

## 2018-01-10 PROCEDURE — 86592 SYPHILIS TEST NON-TREP QUAL: CPT

## 2018-01-10 PROCEDURE — 36415 COLL VENOUS BLD VENIPUNCTURE: CPT

## 2018-01-10 PROCEDURE — 87086 URINE CULTURE/COLONY COUNT: CPT

## 2018-01-10 PROCEDURE — 85025 COMPLETE CBC W/AUTO DIFF WBC: CPT

## 2018-01-10 PROCEDURE — 87081 CULTURE SCREEN ONLY: CPT

## 2018-01-10 PROCEDURE — 86703 HIV-1/HIV-2 1 RESULT ANTBDY: CPT

## 2018-01-10 PROCEDURE — 99999 PR PBB SHADOW E&M-EST. PATIENT-LVL II: CPT | Mod: PBBFAC,,, | Performed by: NURSE PRACTITIONER

## 2018-01-10 NOTE — PROGRESS NOTES
Here for routine OB appt at 35w6d, with complaints of back pain, GERD, and vulva varicosities.  Discussed Tylenol PRN, Tums PRN, and pelvic girdle. Pt unable to tolerate cervical exam.  Reports good FM.  Denies LOF, denies VB, denies contractions.  Reviewed warning signs of Labor and Preeclampsia.  Daily FM counts reinforced.  Peds- Dr. Koehler.  Plans to breastfeed- RX for pump given.   GBS and T3 labs today.  F/U scheduled 1 weeks

## 2018-01-11 ENCOUNTER — TELEPHONE (OUTPATIENT)
Dept: OBSTETRICS AND GYNECOLOGY | Facility: CLINIC | Age: 33
End: 2018-01-11

## 2018-01-11 ENCOUNTER — PATIENT MESSAGE (OUTPATIENT)
Dept: ADMINISTRATIVE | Facility: OTHER | Age: 33
End: 2018-01-11

## 2018-01-11 DIAGNOSIS — O99.013 ANEMIA DURING PREGNANCY IN THIRD TRIMESTER: Primary | ICD-10-CM

## 2018-01-11 LAB
HIV 1+2 AB+HIV1 P24 AG SERPL QL IA: NEGATIVE
RPR SER QL: NORMAL

## 2018-01-11 RX ORDER — IRON POLYSACCHARIDE COMPLEX 150 MG
150 CAPSULE ORAL 2 TIMES DAILY
Qty: 60 CAPSULE | Refills: 6 | Status: SHIPPED | OUTPATIENT
Start: 2018-01-11 | End: 2018-01-25

## 2018-01-11 NOTE — TELEPHONE ENCOUNTER
Patient notified of results and Rx sent to pharmacy. Patient verbalized understanding.           Please notify pt her CBC showed anemia.  I sent an iron supplement- Niferex to the pharmacy take 2 times/ day.  Iron supplements can cause constipation so can also use OTC stool softener such as Colace once or twice daily.

## 2018-01-12 LAB — BACTERIA UR CULT: NORMAL

## 2018-01-13 LAB — BACTERIA SPEC AEROBE CULT: NORMAL

## 2018-01-17 ENCOUNTER — TELEPHONE (OUTPATIENT)
Dept: OBSTETRICS AND GYNECOLOGY | Facility: CLINIC | Age: 33
End: 2018-01-17

## 2018-01-17 NOTE — TELEPHONE ENCOUNTER
----- Message from Neida Spence sent at 1/17/2018  1:46 PM CST -----  Contact: self  x_  1st Request  _  2nd Request  _  3rd Request    Who: pt    Why: pt returning call.. Please advise    What Number to Call Back: [phone]    When to Expect a call back: (Before the end of the day)   -- if call after 3:00 call back will be tomorrow.

## 2018-01-17 NOTE — TELEPHONE ENCOUNTER
Patient called to let staff know that she may not be coming in to her appointment tomorrow due to the weather. Patient advised that we can get her rescheduled if needed. Patient verbalized understanding and stated that she will call us in the morning.

## 2018-01-18 ENCOUNTER — TELEPHONE (OUTPATIENT)
Dept: OBSTETRICS AND GYNECOLOGY | Facility: CLINIC | Age: 33
End: 2018-01-18

## 2018-01-18 ENCOUNTER — PATIENT MESSAGE (OUTPATIENT)
Dept: OBSTETRICS AND GYNECOLOGY | Facility: CLINIC | Age: 33
End: 2018-01-18

## 2018-01-18 NOTE — TELEPHONE ENCOUNTER
Patient stated that she is not coming to her appointment because she does not want to drive all the way to [place]. Patient advise to call if she began experience any issues so that she can be added to the schedule at a later date. Patient verbalized understanding.

## 2018-01-19 ENCOUNTER — TELEPHONE (OUTPATIENT)
Dept: OBSTETRICS AND GYNECOLOGY | Facility: CLINIC | Age: 33
End: 2018-01-19

## 2018-01-19 ENCOUNTER — PATIENT MESSAGE (OUTPATIENT)
Dept: OBSTETRICS AND GYNECOLOGY | Facility: CLINIC | Age: 33
End: 2018-01-19

## 2018-01-19 NOTE — TELEPHONE ENCOUNTER
Patient would like to know if her induction can be scheduled on Feb. 5. She will be 39w 3d.     Hey Z can you ask Dr. Wilson if we can go ahead and get the induction in the books for the night of Monday Feb 5th. I will be 39 weeks and 4 days. We were supposed to talk about that at the appointment this week that I couldn't make. Thank you. Ill see yall soon!     Jodi

## 2018-01-25 ENCOUNTER — ROUTINE PRENATAL (OUTPATIENT)
Dept: OBSTETRICS AND GYNECOLOGY | Facility: CLINIC | Age: 33
End: 2018-01-25
Payer: COMMERCIAL

## 2018-01-25 VITALS — BODY MASS INDEX: 28.66 KG/M2 | SYSTOLIC BLOOD PRESSURE: 122 MMHG | DIASTOLIC BLOOD PRESSURE: 76 MMHG | WEIGHT: 188.5 LBS

## 2018-01-25 DIAGNOSIS — Z3A.38 38 WEEKS GESTATION OF PREGNANCY: Primary | ICD-10-CM

## 2018-01-25 PROCEDURE — 99999 PR PBB SHADOW E&M-EST. PATIENT-LVL II: CPT | Mod: PBBFAC,,, | Performed by: OBSTETRICS & GYNECOLOGY

## 2018-01-25 PROCEDURE — 0502F SUBSEQUENT PRENATAL CARE: CPT | Mod: S$GLB,,, | Performed by: OBSTETRICS & GYNECOLOGY

## 2018-01-25 NOTE — PROGRESS NOTES
HERE for routine OB visit at 38 0/7 wks, with NO complaints.  Denies vaginal bleeding, cramping/ ctx, or LOF.  + FM.  FMC BID.   Will schedule induction after 39 weeks.  Risks and benefit discussed.

## 2018-01-25 NOTE — PROGRESS NOTES
The right axilla has a 1 cm mobile mass.  NONTENDER.  Possible LN- will do a US of the right breast/ axilla

## 2018-01-29 ENCOUNTER — PATIENT MESSAGE (OUTPATIENT)
Dept: OBSTETRICS AND GYNECOLOGY | Facility: CLINIC | Age: 33
End: 2018-01-29

## 2018-02-01 ENCOUNTER — ROUTINE PRENATAL (OUTPATIENT)
Dept: OBSTETRICS AND GYNECOLOGY | Facility: CLINIC | Age: 33
End: 2018-02-01
Payer: COMMERCIAL

## 2018-02-01 VITALS — DIASTOLIC BLOOD PRESSURE: 64 MMHG | SYSTOLIC BLOOD PRESSURE: 116 MMHG | BODY MASS INDEX: 29 KG/M2 | WEIGHT: 190.69 LBS

## 2018-02-01 DIAGNOSIS — Z3A.39 39 WEEKS GESTATION OF PREGNANCY: Primary | ICD-10-CM

## 2018-02-01 PROCEDURE — 0502F SUBSEQUENT PRENATAL CARE: CPT | Mod: S$GLB,,, | Performed by: OBSTETRICS & GYNECOLOGY

## 2018-02-01 PROCEDURE — 99999 PR PBB SHADOW E&M-EST. PATIENT-LVL II: CPT | Mod: PBBFAC,,, | Performed by: OBSTETRICS & GYNECOLOGY

## 2018-02-01 NOTE — PROGRESS NOTES
HERE for routine OB visit at 39 0/7 wks, with NO complaints.  Denies vaginal bleeding, cramping/ ctx, or LOF.  + FM.  FMC BID.  Has induction next week. DID not tolerate a vaginal exam today.

## 2018-02-05 ENCOUNTER — ANESTHESIA EVENT (OUTPATIENT)
Dept: OBSTETRICS AND GYNECOLOGY | Facility: OTHER | Age: 33
End: 2018-02-05
Payer: COMMERCIAL

## 2018-02-05 ENCOUNTER — HOSPITAL ENCOUNTER (INPATIENT)
Facility: OTHER | Age: 33
LOS: 3 days | Discharge: HOME | End: 2018-02-08
Attending: OBSTETRICS & GYNECOLOGY | Admitting: OBSTETRICS & GYNECOLOGY
Payer: COMMERCIAL

## 2018-02-05 ENCOUNTER — ANESTHESIA (OUTPATIENT)
Dept: OBSTETRICS AND GYNECOLOGY | Facility: OTHER | Age: 33
End: 2018-02-05
Payer: COMMERCIAL

## 2018-02-05 DIAGNOSIS — Z87.59 HISTORY OF POSTPARTUM HEMORRHAGE: ICD-10-CM

## 2018-02-05 DIAGNOSIS — Z3A.39 PREGNANCY WITH 39 COMPLETED WEEKS GESTATION: ICD-10-CM

## 2018-02-05 PROBLEM — D64.9 CHRONIC ANEMIA: Status: ACTIVE | Noted: 2018-02-05

## 2018-02-05 LAB
ABO + RH BLD: NORMAL
BASOPHILS # BLD AUTO: 0.01 K/UL
BASOPHILS NFR BLD: 0.1 %
BLD GP AB SCN CELLS X3 SERPL QL: NORMAL
DIFFERENTIAL METHOD: ABNORMAL
EOSINOPHIL # BLD AUTO: 0.1 K/UL
EOSINOPHIL NFR BLD: 0.7 %
ERYTHROCYTE [DISTWIDTH] IN BLOOD BY AUTOMATED COUNT: 16.3 %
HCT VFR BLD AUTO: 31.3 %
HGB BLD-MCNC: 9.9 G/DL
LYMPHOCYTES # BLD AUTO: 3.1 K/UL
LYMPHOCYTES NFR BLD: 30.6 %
MCH RBC QN AUTO: 25.8 PG
MCHC RBC AUTO-ENTMCNC: 31.6 G/DL
MCV RBC AUTO: 82 FL
MONOCYTES # BLD AUTO: 0.9 K/UL
MONOCYTES NFR BLD: 9 %
NEUTROPHILS # BLD AUTO: 6 K/UL
NEUTROPHILS NFR BLD: 58.9 %
PLATELET # BLD AUTO: 262 K/UL
PMV BLD AUTO: 9.5 FL
RBC # BLD AUTO: 3.84 M/UL
WBC # BLD AUTO: 10.1 K/UL

## 2018-02-05 PROCEDURE — 27200710 HC EPIDURAL INFUSION PUMP SET: Performed by: STUDENT IN AN ORGANIZED HEALTH CARE EDUCATION/TRAINING PROGRAM

## 2018-02-05 PROCEDURE — 25000003 PHARM REV CODE 250

## 2018-02-05 PROCEDURE — 62326 NJX INTERLAMINAR LMBR/SAC: CPT | Performed by: STUDENT IN AN ORGANIZED HEALTH CARE EDUCATION/TRAINING PROGRAM

## 2018-02-05 PROCEDURE — 11000001 HC ACUTE MED/SURG PRIVATE ROOM

## 2018-02-05 PROCEDURE — 85025 COMPLETE CBC W/AUTO DIFF WBC: CPT

## 2018-02-05 PROCEDURE — 25000003 PHARM REV CODE 250: Performed by: STUDENT IN AN ORGANIZED HEALTH CARE EDUCATION/TRAINING PROGRAM

## 2018-02-05 PROCEDURE — 59409 OBSTETRICAL CARE: CPT | Mod: QY,,, | Performed by: ANESTHESIOLOGY

## 2018-02-05 PROCEDURE — 3E0P7VZ INTRODUCTION OF HORMONE INTO FEMALE REPRODUCTIVE, VIA NATURAL OR ARTIFICIAL OPENING: ICD-10-PCS | Performed by: OBSTETRICS & GYNECOLOGY

## 2018-02-05 PROCEDURE — 27800517 HC TRAY,EPIDURAL-CONTINUOUS: Performed by: STUDENT IN AN ORGANIZED HEALTH CARE EDUCATION/TRAINING PROGRAM

## 2018-02-05 PROCEDURE — 86901 BLOOD TYPING SEROLOGIC RH(D): CPT

## 2018-02-05 RX ORDER — FENTANYL/BUPIVACAINE/NS/PF 2MCG/ML-.1
PLASTIC BAG, INJECTION (ML) INJECTION
Status: COMPLETED
Start: 2018-02-05 | End: 2018-02-05

## 2018-02-05 RX ORDER — ONDANSETRON 8 MG/1
8 TABLET, ORALLY DISINTEGRATING ORAL EVERY 8 HOURS PRN
Status: DISCONTINUED | OUTPATIENT
Start: 2018-02-05 | End: 2018-02-06

## 2018-02-05 RX ORDER — TERBUTALINE SULFATE 1 MG/ML
0.25 INJECTION SUBCUTANEOUS
Status: DISCONTINUED | OUTPATIENT
Start: 2018-02-05 | End: 2018-02-06

## 2018-02-05 RX ORDER — OXYTOCIN/RINGER'S LACTATE 20/1000 ML
41.7 PLASTIC BAG, INJECTION (ML) INTRAVENOUS CONTINUOUS
Status: DISCONTINUED | OUTPATIENT
Start: 2018-02-05 | End: 2018-02-06

## 2018-02-05 RX ORDER — OXYTOCIN/RINGER'S LACTATE 20/1000 ML
10 PLASTIC BAG, INJECTION (ML) INTRAVENOUS
Status: DISCONTINUED | OUTPATIENT
Start: 2018-02-05 | End: 2018-02-06

## 2018-02-05 RX ORDER — FENTANYL/BUPIVACAINE/NS/PF 2MCG/ML-.1
PLASTIC BAG, INJECTION (ML) INJECTION CONTINUOUS PRN
Status: DISCONTINUED | OUTPATIENT
Start: 2018-02-05 | End: 2018-02-06

## 2018-02-05 RX ORDER — SODIUM CHLORIDE 9 MG/ML
INJECTION, SOLUTION INTRAVENOUS
Status: DISCONTINUED | OUTPATIENT
Start: 2018-02-05 | End: 2018-02-06

## 2018-02-05 RX ORDER — SODIUM CHLORIDE, SODIUM LACTATE, POTASSIUM CHLORIDE, CALCIUM CHLORIDE 600; 310; 30; 20 MG/100ML; MG/100ML; MG/100ML; MG/100ML
INJECTION, SOLUTION INTRAVENOUS CONTINUOUS
Status: DISCONTINUED | OUTPATIENT
Start: 2018-02-05 | End: 2018-02-06

## 2018-02-05 RX ORDER — OXYTOCIN/RINGER'S LACTATE 20/1000 ML
333 PLASTIC BAG, INJECTION (ML) INTRAVENOUS CONTINUOUS
Status: ACTIVE | OUTPATIENT
Start: 2018-02-05 | End: 2018-02-05

## 2018-02-05 RX ORDER — SODIUM CITRATE AND CITRIC ACID MONOHYDRATE 334; 500 MG/5ML; MG/5ML
30 SOLUTION ORAL ONCE
Status: DISCONTINUED | OUTPATIENT
Start: 2018-02-06 | End: 2018-02-06

## 2018-02-05 RX ORDER — FAMOTIDINE 10 MG/ML
20 INJECTION INTRAVENOUS ONCE
Status: DISCONTINUED | OUTPATIENT
Start: 2018-02-06 | End: 2018-02-06

## 2018-02-05 RX ORDER — FENTANYL/BUPIVACAINE/NS/PF 2MCG/ML-.1
PLASTIC BAG, INJECTION (ML) INJECTION CONTINUOUS
Status: DISCONTINUED | OUTPATIENT
Start: 2018-02-06 | End: 2018-02-06

## 2018-02-05 RX ADMIN — Medication: at 10:02

## 2018-02-05 RX ADMIN — MISOPROSTOL 50 MCG: 100 TABLET ORAL at 10:02

## 2018-02-05 RX ADMIN — Medication 10 ML/HR: at 10:02

## 2018-02-05 RX ADMIN — SODIUM CHLORIDE, SODIUM LACTATE, POTASSIUM CHLORIDE, AND CALCIUM CHLORIDE: .6; .31; .03; .02 INJECTION, SOLUTION INTRAVENOUS at 09:02

## 2018-02-06 LAB
ANISOCYTOSIS BLD QL SMEAR: SLIGHT
BASOPHILS NFR BLD: 0 %
DIFFERENTIAL METHOD: ABNORMAL
EOSINOPHIL NFR BLD: 0 %
ERYTHROCYTE [DISTWIDTH] IN BLOOD BY AUTOMATED COUNT: 16.6 %
HCT VFR BLD AUTO: 29.1 %
HGB BLD-MCNC: 9 G/DL
LYMPHOCYTES NFR BLD: 21 %
MCH RBC QN AUTO: 25.4 PG
MCHC RBC AUTO-ENTMCNC: 30.9 G/DL
MCV RBC AUTO: 82 FL
MONOCYTES NFR BLD: 4 %
NEUTROPHILS NFR BLD: 73 %
NEUTS BAND NFR BLD MANUAL: 2 %
PLATELET # BLD AUTO: 228 K/UL
PLATELET BLD QL SMEAR: ABNORMAL
PMV BLD AUTO: 9.4 FL
RBC # BLD AUTO: 3.54 M/UL
WBC # BLD AUTO: 11.29 K/UL

## 2018-02-06 PROCEDURE — 72200005 HC VAGINAL DELIVERY LEVEL II

## 2018-02-06 PROCEDURE — 63600175 PHARM REV CODE 636 W HCPCS: Performed by: ANESTHESIOLOGY

## 2018-02-06 PROCEDURE — 59400 OBSTETRICAL CARE: CPT | Mod: GB,,, | Performed by: OBSTETRICS & GYNECOLOGY

## 2018-02-06 PROCEDURE — 25000003 PHARM REV CODE 250: Performed by: ANESTHESIOLOGY

## 2018-02-06 PROCEDURE — 4A1HXCZ MONITORING OF PRODUCTS OF CONCEPTION, CARDIAC RATE, EXTERNAL APPROACH: ICD-10-PCS | Performed by: OBSTETRICS & GYNECOLOGY

## 2018-02-06 PROCEDURE — 25000003 PHARM REV CODE 250: Performed by: STUDENT IN AN ORGANIZED HEALTH CARE EDUCATION/TRAINING PROGRAM

## 2018-02-06 PROCEDURE — 72100002 HC LABOR CARE, 1ST 8 HOURS

## 2018-02-06 PROCEDURE — 0KQM0ZZ REPAIR PERINEUM MUSCLE, OPEN APPROACH: ICD-10-PCS | Performed by: OBSTETRICS & GYNECOLOGY

## 2018-02-06 PROCEDURE — 85007 BL SMEAR W/DIFF WBC COUNT: CPT

## 2018-02-06 PROCEDURE — 85027 COMPLETE CBC AUTOMATED: CPT

## 2018-02-06 PROCEDURE — 72100003 HC LABOR CARE, EA. ADDL. 8 HRS

## 2018-02-06 PROCEDURE — 63600175 PHARM REV CODE 636 W HCPCS: Performed by: STUDENT IN AN ORGANIZED HEALTH CARE EDUCATION/TRAINING PROGRAM

## 2018-02-06 PROCEDURE — 11000001 HC ACUTE MED/SURG PRIVATE ROOM

## 2018-02-06 PROCEDURE — 36415 COLL VENOUS BLD VENIPUNCTURE: CPT

## 2018-02-06 PROCEDURE — 10907ZC DRAINAGE OF AMNIOTIC FLUID, THERAPEUTIC FROM PRODUCTS OF CONCEPTION, VIA NATURAL OR ARTIFICIAL OPENING: ICD-10-PCS | Performed by: OBSTETRICS & GYNECOLOGY

## 2018-02-06 PROCEDURE — 51702 INSERT TEMP BLADDER CATH: CPT

## 2018-02-06 RX ORDER — DOCUSATE SODIUM 100 MG/1
200 CAPSULE, LIQUID FILLED ORAL 2 TIMES DAILY
Status: DISCONTINUED | OUTPATIENT
Start: 2018-02-06 | End: 2018-02-08 | Stop reason: HOSPADM

## 2018-02-06 RX ORDER — OXYTOCIN/RINGER'S LACTATE 20/1000 ML
41.65 PLASTIC BAG, INJECTION (ML) INTRAVENOUS CONTINUOUS
Status: ACTIVE | OUTPATIENT
Start: 2018-02-06 | End: 2018-02-06

## 2018-02-06 RX ORDER — DIPHENHYDRAMINE HYDROCHLORIDE 50 MG/ML
25 INJECTION INTRAMUSCULAR; INTRAVENOUS EVERY 4 HOURS PRN
Status: DISCONTINUED | OUTPATIENT
Start: 2018-02-06 | End: 2018-02-08 | Stop reason: HOSPADM

## 2018-02-06 RX ORDER — HYDROCODONE BITARTRATE AND ACETAMINOPHEN 5; 325 MG/1; MG/1
1 TABLET ORAL EVERY 4 HOURS PRN
Status: DISCONTINUED | OUTPATIENT
Start: 2018-02-06 | End: 2018-02-08 | Stop reason: HOSPADM

## 2018-02-06 RX ORDER — FENTANYL CITRATE 50 UG/ML
INJECTION, SOLUTION INTRAMUSCULAR; INTRAVENOUS
Status: COMPLETED
Start: 2018-02-06 | End: 2018-02-06

## 2018-02-06 RX ORDER — IBUPROFEN 600 MG/1
600 TABLET ORAL EVERY 6 HOURS
Status: DISCONTINUED | OUTPATIENT
Start: 2018-02-06 | End: 2018-02-08 | Stop reason: HOSPADM

## 2018-02-06 RX ORDER — MISOPROSTOL 200 UG/1
600 TABLET ORAL
Status: DISCONTINUED | OUTPATIENT
Start: 2018-02-06 | End: 2018-02-08 | Stop reason: HOSPADM

## 2018-02-06 RX ORDER — HYDROCORTISONE 25 MG/G
CREAM TOPICAL 3 TIMES DAILY PRN
Status: DISCONTINUED | OUTPATIENT
Start: 2018-02-06 | End: 2018-02-08 | Stop reason: HOSPADM

## 2018-02-06 RX ORDER — DIPHENHYDRAMINE HCL 25 MG
25 CAPSULE ORAL EVERY 6 HOURS PRN
Status: DISCONTINUED | OUTPATIENT
Start: 2018-02-06 | End: 2018-02-06

## 2018-02-06 RX ORDER — FENTANYL CITRATE 50 UG/ML
INJECTION, SOLUTION INTRAMUSCULAR; INTRAVENOUS
Status: DISCONTINUED | OUTPATIENT
Start: 2018-02-06 | End: 2018-02-06

## 2018-02-06 RX ORDER — OXYTOCIN/RINGER'S LACTATE 20/1000 ML
2 PLASTIC BAG, INJECTION (ML) INTRAVENOUS CONTINUOUS
Status: DISCONTINUED | OUTPATIENT
Start: 2018-02-06 | End: 2018-02-06

## 2018-02-06 RX ORDER — FERROUS SULFATE 325(65) MG
325 TABLET, DELAYED RELEASE (ENTERIC COATED) ORAL DAILY
Status: DISCONTINUED | OUTPATIENT
Start: 2018-02-06 | End: 2018-02-08 | Stop reason: HOSPADM

## 2018-02-06 RX ORDER — BUPIVACAINE HYDROCHLORIDE 2.5 MG/ML
INJECTION, SOLUTION EPIDURAL; INFILTRATION; INTRACAUDAL
Status: DISPENSED
Start: 2018-02-06 | End: 2018-02-06

## 2018-02-06 RX ORDER — OXYTOCIN/RINGER'S LACTATE 20/1000 ML
20 PLASTIC BAG, INJECTION (ML) INTRAVENOUS ONCE
Status: DISCONTINUED | OUTPATIENT
Start: 2018-02-06 | End: 2018-02-08 | Stop reason: HOSPADM

## 2018-02-06 RX ORDER — CARBOPROST TROMETHAMINE 250 UG/ML
250 INJECTION, SOLUTION INTRAMUSCULAR
Status: DISCONTINUED | OUTPATIENT
Start: 2018-02-06 | End: 2018-02-08 | Stop reason: HOSPADM

## 2018-02-06 RX ORDER — ACETAMINOPHEN 325 MG/1
650 TABLET ORAL EVERY 6 HOURS PRN
Status: DISCONTINUED | OUTPATIENT
Start: 2018-02-06 | End: 2018-02-08 | Stop reason: HOSPADM

## 2018-02-06 RX ORDER — METHYLERGONOVINE MALEATE 0.2 MG/ML
200 INJECTION INTRAVENOUS
Status: DISCONTINUED | OUTPATIENT
Start: 2018-02-06 | End: 2018-02-08 | Stop reason: HOSPADM

## 2018-02-06 RX ORDER — DIPHENHYDRAMINE HCL 25 MG
25 CAPSULE ORAL EVERY 4 HOURS PRN
Status: DISCONTINUED | OUTPATIENT
Start: 2018-02-06 | End: 2018-02-08 | Stop reason: HOSPADM

## 2018-02-06 RX ORDER — ONDANSETRON 8 MG/1
8 TABLET, ORALLY DISINTEGRATING ORAL EVERY 8 HOURS PRN
Status: DISCONTINUED | OUTPATIENT
Start: 2018-02-06 | End: 2018-02-08 | Stop reason: HOSPADM

## 2018-02-06 RX ORDER — HYDROCODONE BITARTRATE AND ACETAMINOPHEN 10; 325 MG/1; MG/1
1 TABLET ORAL EVERY 4 HOURS PRN
Status: DISCONTINUED | OUTPATIENT
Start: 2018-02-06 | End: 2018-02-08 | Stop reason: HOSPADM

## 2018-02-06 RX ADMIN — FENTANYL CITRATE 100 MCG: 50 INJECTION, SOLUTION INTRAMUSCULAR; INTRAVENOUS at 05:02

## 2018-02-06 RX ADMIN — Medication 10 ML: at 05:02

## 2018-02-06 RX ADMIN — Medication 2 MILLI-UNITS/MIN: at 02:02

## 2018-02-06 RX ADMIN — IBUPROFEN 600 MG: 600 TABLET, FILM COATED ORAL at 05:02

## 2018-02-06 RX ADMIN — IBUPROFEN 600 MG: 600 TABLET, FILM COATED ORAL at 11:02

## 2018-02-06 RX ADMIN — IBUPROFEN 600 MG: 600 TABLET, FILM COATED ORAL at 12:02

## 2018-02-06 RX ADMIN — HYDROCODONE BITARTRATE AND ACETAMINOPHEN 1 TABLET: 10; 325 TABLET ORAL at 11:02

## 2018-02-06 RX ADMIN — HYDROCODONE BITARTRATE AND ACETAMINOPHEN 1 TABLET: 10; 325 TABLET ORAL at 07:02

## 2018-02-06 RX ADMIN — DIPHENHYDRAMINE HYDROCHLORIDE 25 MG: 25 CAPSULE ORAL at 01:02

## 2018-02-06 NOTE — LACTATION NOTE
Went for follow up care, pt called earlier but with a pt.  Pt reported baby nursed for 40 minutes with some difficulties but the feeding is improving.  Encouraged lots of skin to skin and to position the baby comfortably for burping position.

## 2018-02-06 NOTE — L&D DELIVERY NOTE
[facility]  Vaginal Delivery   Operative Note    SUMMARY     Patient checked after feeling pressure and cervix complete and fetal station +1 position was direct OA. Patient set up and primary OBGYN staff was called. Patient delivered large viable female infant after approximately 5 minutes of pushing. Baby placed onto mom's chest for skin to skin. Delayed cord clamping performed. Cord clamped and cut after 45 seconds. Placenta delivered spontaneously after 1 minute of stead traction. Pitocin was started. Placenta appeared intact. Evaluation of vaginal area revealed second degree posterior laceration. No other lacerations seen. Cervix evaluated with help of right angle speculum and no lacerations present. Uterus was firm. Second degree laceration repaired with 2 sutures of 2-0 vicryl. Repair was well approximated. Vagina was hemostatic. Needle and lap count was correct x2.       Indications: Indication for care in labor or delivery  Pregnancy complicated by:   Patient Active Problem List   Diagnosis     (spontaneous vaginal delivery)    Acute posthemorrhagic anemia    Third degree perineal laceration during delivery, postpartum condition or complication    Constipation    Hemorrhoid    Pregnancy with 39 completed weeks gestation    Indication for care in labor or delivery    History of postpartum hemorrhage    Chronic anemia     Admitting GA: 39w5d    Delivery Information for  Girl Jodi Daniel    Birth information:  YOB: 2017   Time of birth: 7:09 AM   Sex: female   Head Delivery Date/Time: 2018  7:09 AM   Delivery type: Vaginal, Spontaneous Delivery   Gestational Age: 39w5d    Delivery Providers    Delivering clinician:  Nieves Wilson MD   Other personnel:   Provider Role   MD Halima Hdz RN Megan W. Seay, RN Melissa A Barcia-Pique, RN                [place] Measurements    Weight:  4050 g Length:  54 cm   Head circum.:   34.9 cm Chest circum.:  35.6 cm          [place] Assessment    Living status:  Living  Apgars:     1 Minute:   5 Minute:   10 Minute:   15 Minute:   20 Minute:     Skin Color:   1  1       Heart Rate:   2  2       Reflex Irritability:   2  2       Muscle Tone:   2  2       Respiratory Effort:   2  2       Total:   9  9                      Assisted Delivery Details:    Forceps attempted?:  No  Vacuum extractor attempted?:  No         Shoulder Dystocia    Shoulder dystocia present?:  No           Presentation and Position    Presentation:   Vertex   Position:       Occiput    Anterior            Interventions/Resuscitation    Method:  None       Cord    Vessels:  3 vessels  Complications:  None  Delayed Cord Clamping?:  No  Cord Clamped Date/Time:  2018  7:09 AM  Cord Blood Disposition:  Sent with Baby  Gases Sent?:  No  Stem Cell Collection (by MD):  No       Placenta    Date and time:  2018  7:12 AM  Removal:  Spontaneous  Appearance:  Intact  Placenta disposition:  discarded           Labor Events:       labor: No     Labor Onset Date/Time:         Dilation Complete Date/Time:         Start Pushing Date/Time:       Rupture Date/Time:              Rupture type:           Fluid Amount:        Fluid Color:        Fluid Odor:        Membrane Status (PeriCalm): ARM (Artificial Rupture)      Rupture Date/Time (PeriCalm): 2018 03:45:00      Fluid Amount (PeriCalm): Large      Fluid Color (PeriCalm): Clear       steroids:       Antibiotics given for GBS:       Induction: misoprostol     Indications for induction:  Elective     Augmentation:       Indications for augmentation:       Labor complications: None     Additional complications:          Cervical ripening:                     Delivery:      Episiotomy: None     Indication for Episiotomy:       Perineal Lacerations: 2nd Repaired:  Yes   Periurethral Laceration: none Repaired:     Labial Laceration: none Repaired:     Sulcus Laceration:  none Repaired:     Vaginal Laceration: No Repaired:     Cervical Laceration: No Repaired:     Repair suture:       Repair # of packets: 2     Vaginal delivery QBL (mL): 150      QBL (mL): 0     Combined Blood Loss (mL): 150     Vaginal Sweep Performed: No     Surgicount Correct: No       Other providers:       Anesthesia    Method:  Epidural          Details (if applicable):  Trial of Labor      Categorization:      Priority:     Indications for :     Incision Type:       Additional  information:  Forceps:    Vacuum:    Breech:    Observed anomalies    Other (Comments):

## 2018-02-06 NOTE — ASSESSMENT & PLAN NOTE
- likely 2/2 3rd degree laceration  - will monitor closely at time of delivery for uterine atony and risk of repeat PPH.

## 2018-02-06 NOTE — ANESTHESIA PROCEDURE NOTES
Epidural    Patient location during procedure: OB   Reason for block: primary anesthetic   Diagnosis: Labor   Start time: 2/5/2018 10:37 PM  Timeout: 2/5/2018 10:36 PM  End time: 2/5/2018 10:56 PM  Surgery related to: Vaginal Delivery  Staffing  Anesthesiologist: PARMINDER LAWRENCE  Performed: anesthesiologist   Preanesthetic Checklist  Completed: patient identified, site marked, surgical consent, pre-op evaluation, timeout performed, IV checked, risks and benefits discussed, monitors and equipment checked, anesthesia consent given, hand hygiene performed and patient being monitored  Preparation  Patient position: sitting  Prep: ChloraPrep  Patient monitoring: Pulse Ox  Epidural  Skin Anesthetic: lidocaine 1%  Skin Wheal: 3 mL  Administration type: continuous  Approach: midline  Interspace: L3-4  Injection technique: DOMINGA saline  Needle and Epidural Catheter  Needle type: Tuohy   Needle gauge: 18  Needle length: 3.5 inches  Needle insertion depth: 6 cm  Catheter type: [employer] Incubator  Catheter size: 19 G  Catheter at skin depth: 11 cm  Test dose: 3 mL of lidocaine 1.5% with Epi 1-to-200,000  Additional Documentation: incremental injection, negative aspiration for heme and CSF, no paresthesia on injection, no signs/symptoms of IV or SA injection, no significant pain on injection and no significant complaints from patient  Needle localization: anatomical landmarks  Medications:  Volume per aspiration: 5 mL  Time between aspirations: 5 minutes  Assessment  Ease of block: easy  Patient's tolerance of the procedure: comfortable throughout block and no complaints

## 2018-02-06 NOTE — H&P
[facility]  Obstetrics  History & Physical    Patient Name: Jodi Daniel  MRN: [id number]  Admission Date: 2018  Primary Care Provider: Elma Esquivel MD    Subjective:     Principal Problem:Indication for care in labor or delivery    History of Present Illness:  Jodi Daniel is a 32 y.o. [id number] at 39w4d presents for scheduled IOL. She has no complaints today. Patient reports mild contractions, denies vaginal bleeding, denies LOF.   Fetal Movement: normal.       This IUP is complicated by history of  complicated by 3rd degree lacertaion and PPH of 1000cc resulting in ABLA.     Blood Type A POS   GBBS: negative  Rubella: Immune  RPR: NR  HIV: negative  HepB: negative      Obstetric History       T1      L1     SAB0   TAB0   Ectopic0   Multiple0   Live Births1       # Outcome Date GA Lbr Tobi/2nd Weight Sex Delivery Anes PTL Lv   2 Current            1 Term 16 41w0d / 01:01 4.054 kg (8 lb 15 oz) F Vag-Spont EPI N GREGORY      Apgar1:  9                Apgar5: 9        Past Medical History:   Diagnosis Date    Psoriasis      Past Surgical History:   Procedure Laterality Date    BREAST BIOPSY      fibroadenoma    BREAST LUMPECTOMY      SINUS SURGERY         PTA Medications   Medication Sig    butalbital-acetaminophen-caffeine -40 mg (FIORICET, ESGIC) -40 mg per tablet        Review of patient's allergies indicates:   Allergen Reactions    Penicillins Itching        Family History     Problem Relation (Age of Onset)    Breast cancer Maternal Aunt    Colon cancer Mother (48)    Hypertension Mother        Social History Main Topics    Smoking status: Never Smoker    Smokeless tobacco: Never Used    Alcohol use No      Comment: occasional; stopping for pregnancy    Drug use: No    Sexual activity: Yes     Partners: Male     Birth control/ protection: None      Comment: Sexually active w/      Review of Systems   Constitutional:  Negative for activity change, chills and fatigue.   Eyes: Negative for visual disturbance.   Respiratory: Negative for shortness of breath.    Cardiovascular: Negative for chest pain and palpitations.   Gastrointestinal: Negative for abdominal pain, constipation, diarrhea, nausea and vomiting.   Genitourinary: Negative for dysuria, vaginal bleeding, vaginal discharge, vaginal pain and vaginal odor.   Musculoskeletal: Negative for myalgias.   Skin:  Negative for rash.   Neurological: Negative for headaches.   Psychiatric/Behavioral: Negative for depression.      Objective:     Vital Signs (Most Recent):  Temp: 97.3 °F (36.3 °C) (02/05/18 2121)  Pulse: 105 (02/05/18 2121)  Resp: 18 (02/05/18 2121)  BP: 122/72 (02/05/18 2121)  SpO2: 99 % (02/05/18 2121) Vital Signs (24h Range):  Temp:  [97.3 °F (36.3 °C)] 97.3 °F (36.3 °C)  Pulse:  [103-105] 105  Resp:  [18] 18  SpO2:  [99 %] 99 %  BP: (122)/(72) 122/72     Weight: 86.5 kg (190 lb 11.2 oz)  Body mass index is 29.87 kg/m².    FHT: Cat 1 (reassuring) 140 bpm, + accels, - decels, moderate BTBV  TOCO: uterine irritability    Physical Exam:   Constitutional: She is oriented to person, place, and time. She appears well-developed and well-nourished.    HENT:   Head: Normocephalic and atraumatic.    Eyes: Conjunctivae and EOM are normal. Pupils are equal, round, and reactive to light.    Neck: Normal range of motion. Neck supple.    Cardiovascular: Normal rate, regular rhythm and normal heart sounds.     Pulmonary/Chest: Effort normal and breath sounds normal. No respiratory distress.        Abdominal: Soft. Bowel sounds are normal. She exhibits no distension. There is no tenderness. There is no rebound and no guarding.   Gravid, size appropriate for dates              Musculoskeletal: Normal range of motion.       Neurological: She is alert and oriented to person, place, and time.    Skin: Skin is warm and dry. No rash noted.    Psychiatric: She has a normal mood and affect.  Her behavior is normal. Judgment and thought content normal.     Cervix:  Dilation:  1  Effacement:  50%  Station: -3  Presentation: Vertex     Significant Labs:  Lab Results   Component Value Date    GROUPTRH A POS 2017    HEPBSAG Negative 2017    STREPBCULT No Group B Streptococcus isolated 01/10/2018     I have personallly reviewed all pertinent lab results from the last 24 hours.    Assessment/Plan:     32 y.o. female  at 39w4d for:    * Indication for care in labor or delivery    - Admit to Labor and Delivery unit  - Consents for delivery including vaginal or  section and blood transfusion signed and to chart  - Risks, benefits, alternatives and possible complications have been discussed in detail with the patient.   - Epidural per Anesthesia  - Draw CBC, T&S  - Notify Staff  - Uterine irritability with irregular contractions: will give PO cytotec  - Recheck in 4 hrs or PRN    Post-Partum Hemorrhage risk - low         Chronic anemia    - H/h on admission 9.9/31.3  - Fe and colace in the post-partum period as indicated        History of postpartum hemorrhage    - likely 2/2 3rd degree laceration  - will monitor closely at time of delivery for uterine atony and risk of repeat PPH.         Third degree perineal laceration during delivery, postpartum condition or complication    - previous delivery note suggests that PPH was secondary to laceration; no mention of uterine atony        Acute posthemorrhagic anemia    - 2/2 intrapartum loss of 1000 cc with previous delivery in 2016            Nuzhat Levy MD  Obstetrics  [facility]

## 2018-02-06 NOTE — HOSPITAL COURSE
2018 Admit to L&D for IOL. Cytotec, pitocin, epidural and AROM used for induction.  2018 - patient found to be complete, had  with no complications; second degree laceration noted.   2018 - PPD#1 s/p . Doing well this morning, meeting all postpartum goals (ambulating, pain well-controlled, tolerating reg diet without n/v, passing flatus, voiding spontaneously).   2018 - PPD#2 s/p . Doing well this morning, meeting all postpartum goals (ambulating, pain well-controlled, tolerating reg diet without n/v, passing flatus, voiding spontaneously). Stable for discharge home with 6 week postpartum follow-up.

## 2018-02-06 NOTE — PROGRESS NOTES
"LABOR NOTE    S. Patient with increased pelvic pain.     O: BP (!) 105/58   Pulse 90   Temp 97.3 °F (36.3 °C) (Temporal)   Resp 18   Ht 5' 7" (1.702 m)   Wt 86.5 kg (190 lb 11.2 oz)   LMP 2017 (Exact Date)   SpO2 99%   Breastfeeding? No   BMI 29.87 kg/m²     FHT: 140 Cat 1 (reassuring)  CTX: q 2-3 minutes  SVE: complete, +1      ASSESSMENT:   32 y.o.  at 39w5d, induction of labor    FHT reassuring    Active Hospital Problems    Diagnosis  POA    *Indication for care in labor or delivery [O75.9]  Yes    Pregnancy with 39 completed weeks gestation [Z3A.39]  Not Applicable    History of postpartum hemorrhage [Z86.2]  Not Applicable    Chronic anemia [D64.9]  Yes    Third degree perineal laceration during delivery, postpartum condition or complication [O70.20]  Yes    Acute posthemorrhagic anemia [D62]  Yes      Resolved Hospital Problems    Diagnosis Date Resolved POA   No resolved problems to display.     PLAN:    Continue Close Maternal/Fetal Monitoring  2200: 50/-3, cytotec now, epidural when ready  0150: 60/-3, Will start Pitocin Augmentation per protocol, plan for AROM at next check  0340: /-2, AROM clear, pit @ 4 mU  Recheck 2 hours or PRN  0615: complete, +1. Will set up to push once feeling increased pressure    Skyler Han MD  OB/GYN PGY-2  Pager: [phone]      "

## 2018-02-06 NOTE — SUBJECTIVE & OBJECTIVE
Obstetric History       T1      L1     SAB0   TAB0   Ectopic0   Multiple0   Live Births1       # Outcome Date GA Lbr Tobi/2nd Weight Sex Delivery Anes PTL Lv   2 Current            1 Term 16 41w0d / 01:01 4.054 kg (8 lb 15 oz) F Vag-Spont EPI N GREGORY      Apgar1:  9                Apgar5: 9        Past Medical History:   Diagnosis Date    Psoriasis      Past Surgical History:   Procedure Laterality Date    BREAST BIOPSY      fibroadenoma    BREAST LUMPECTOMY      SINUS SURGERY  2013       PTA Medications   Medication Sig    butalbital-acetaminophen-caffeine -40 mg (FIORICET, ESGIC) -40 mg per tablet        Review of patient's allergies indicates:   Allergen Reactions    Penicillins Itching        Family History     Problem Relation (Age of Onset)    Breast cancer Maternal Aunt    Colon cancer Mother (48)    Hypertension Mother        Social History Main Topics    Smoking status: Never Smoker    Smokeless tobacco: Never Used    Alcohol use No      Comment: occasional; stopping for pregnancy    Drug use: No    Sexual activity: Yes     Partners: Male     Birth control/ protection: None      Comment: Sexually active w/      Review of Systems   Constitutional: Negative for activity change, chills and fatigue.   Eyes: Negative for visual disturbance.   Respiratory: Negative for shortness of breath.    Cardiovascular: Negative for chest pain and palpitations.   Gastrointestinal: Negative for abdominal pain, constipation, diarrhea, nausea and vomiting.   Genitourinary: Negative for dysuria, vaginal bleeding, vaginal discharge, vaginal pain and vaginal odor.   Musculoskeletal: Negative for myalgias.   Skin:  Negative for rash.   Neurological: Negative for headaches.   Psychiatric/Behavioral: Negative for depression.      Objective:     Vital Signs (Most Recent):  Temp: 97.3 °F (36.3 °C) (18)  Pulse: 105 (18)  Resp: 18 (18)  BP: 122/72 (18  2121)  SpO2: 99 % (02/05/18 2121) Vital Signs (24h Range):  Temp:  [97.3 °F (36.3 °C)] 97.3 °F (36.3 °C)  Pulse:  [103-105] 105  Resp:  [18] 18  SpO2:  [99 %] 99 %  BP: (122)/(72) 122/72     Weight: 86.5 kg (190 lb 11.2 oz)  Body mass index is 29.87 kg/m².    FHT: Cat 1 (reassuring) 140 bpm, + accels, - decels, moderate BTBV  TOCO: uterine irritability    Physical Exam:   Constitutional: She is oriented to person, place, and time. She appears well-developed and well-nourished.    HENT:   Head: Normocephalic and atraumatic.    Eyes: Conjunctivae and EOM are normal. Pupils are equal, round, and reactive to light.    Neck: Normal range of motion. Neck supple.    Cardiovascular: Normal rate, regular rhythm and normal heart sounds.     Pulmonary/Chest: Effort normal and breath sounds normal. No respiratory distress.        Abdominal: Soft. Bowel sounds are normal. She exhibits no distension. There is no tenderness. There is no rebound and no guarding.   Gravid, size appropriate for dates              Musculoskeletal: Normal range of motion.       Neurological: She is alert and oriented to person, place, and time.    Skin: Skin is warm and dry. No rash noted.    Psychiatric: She has a normal mood and affect. Her behavior is normal. Judgment and thought content normal.     Cervix:  Dilation:  1  Effacement:  50%  Station: -3  Presentation: Vertex     Significant Labs:  Lab Results   Component Value Date    GROUPTRH A POS 07/05/2017    HEPBSAG Negative 07/05/2017    STREPBCULT No Group B Streptococcus isolated 01/10/2018     I have personallly reviewed all pertinent lab results from the last 24 hours.

## 2018-02-06 NOTE — PROGRESS NOTES
"LABOR NOTE    S: Comfortable with epidural.    O: BP (!) 103/54   Pulse 95   Temp 97.3 °F (36.3 °C) (Temporal)   Resp 18   Ht 5' 7" (1.702 m)   Wt 86.5 kg (190 lb 11.2 oz)   LMP 2017 (Exact Date)   SpO2 96%   Breastfeeding? No   BMI 29.87 kg/m²       FHT: 140 Cat 1 (reassuring)  CTX: q 2-3 minutes  SVE: /-3      ASSESSMENT:   32 y.o.  at 39w5d, induction of labor    FHT reassuring    Active Hospital Problems    Diagnosis  POA    *Indication for care in labor or delivery [O75.9]  Yes    Pregnancy with 39 completed weeks gestation [Z3A.39]  Not Applicable    History of postpartum hemorrhage [Z86.2]  Not Applicable    Chronic anemia [D64.9]  Yes    Third degree perineal laceration during delivery, postpartum condition or complication [O70.20]  Yes    Acute posthemorrhagic anemia [D62]  Yes      Resolved Hospital Problems    Diagnosis Date Resolved POA   No resolved problems to display.         PLAN:    Continue Close Maternal/Fetal Monitoring  2200: 50/-3, cytotec now, epidural when ready  150: 60/-3, Will start Pitocin Augmentation per protocol, plan for AROM at next check  Recheck 2 hours or PRN      "

## 2018-02-06 NOTE — LACTATION NOTE
Seen pt for lactation counseling.  Reviewed the basics of breastfeeding.  Baby started to cue, latch assist provided.  Assisted with cross cradle hold. Baby suckled a few but was not comfortable and was spitting some sticky mucus.  Repositioned and removed mucus with aspirator.  Placed baby skin to skin with pt.  Counseled accordingly.  Instructed to call when baby starts to cue.  number on the board.      02/06/18 0170   Maternal Medical Surgical History   Surgical History yes   Surgical Procedure breast biopsy;breast lumpectomy   Maternal Infant Assessment   Breast Shape round   Breast Density soft   Areola elastic;surgical scarring  (right)   Nipple(s) everted   Infant Assessment   Sucking Reflex present   Rooting Reflex present   LATCH Score   Latch 1-->repeated attempts, holds nipple in mouth, stimulate to suck   Audible Swallowing 0-->none   Type Of Nipple 2-->everted (after stimulation)   Comfort (Breast/Nipple) 2-->soft/nontender   Hold (Positioning) 1-->minimal assist, teach one side: mother does other, staff holds   Score (less than 7 for 2/more consecutive times, consult Lactation Consultant) 6       Number Scale   Presence of Pain denies   Maternal Infant Feeding   Maternal Emotional State assist needed   Infant Positioning cross-cradle   Time Spent (min) 15-30 min   Latch Assistance yes   Breastfeeding Education adequate infant intake;adequate milk volume;importance of skin-to-skin contact;milk expression, hand   Feeding Infant   Feeding Tolerance/Success tongue thrusting;other (see comments)  (spitting)   Effective Latch During Feeding no   Skin-to-Skin Contact During Feeding yes   Lactation Referrals   Lactation Consult Breastfeeding assessment;Initial assessment   Lactation Interventions   Attachment Promotion counseling provided;breastfeeding assistance provided;skin-to-skin contact encouraged   Breastfeeding Assistance assisted with positioning;support offered   Maternal Breastfeeding Support  lactation counseling provided

## 2018-02-06 NOTE — ASSESSMENT & PLAN NOTE
- previous delivery note suggests that PPH was secondary to laceration; no mention of uterine atony

## 2018-02-06 NOTE — ANESTHESIA PREPROCEDURE EVALUATION
2018  Jodi Daniel is a 32 y.o. female  at 39w4d who presents for IOL. Pt has h/o previous  at 41w0d. Current pregnancy has been otherwise uncomplicated.     OB History    Para Term  AB Living   2 1 1 0 0 1   SAB TAB Ectopic Multiple Live Births   0 0 0 0 1      # Outcome Date GA Lbr Tobi/2nd Weight Sex Delivery Anes PTL Lv   2 Current            1 Term 16 41w0d / 01:01 4.054 kg (8 lb 15 oz) F Vag-Spont EPI N GREGORY      Birth Comments: Hep B given 16          Wt Readings from Last 1 Encounters:   18 1542 86.5 kg (190 lb 11.2 oz)       BP Readings from Last 3 Encounters:   18 122/72   18 116/64   18 122/76       Patient Active Problem List   Diagnosis     (spontaneous vaginal delivery)    Acute posthemorrhagic anemia    Third degree perineal laceration during delivery, postpartum condition or complication    Constipation    Hemorrhoid    Pregnancy with 39 completed weeks gestation    Indication for care in labor or delivery       Past Surgical History:   Procedure Laterality Date    BREAST BIOPSY      fibroadenoma    BREAST LUMPECTOMY      SINUS SURGERY  2013       Social History     Social History    Marital status:      Spouse name: N/A    Number of children: N/A    Years of education: N/A     Occupational History    RN      Social History Main Topics    Smoking status: Never Smoker    Smokeless tobacco: Never Used    Alcohol use No      Comment: occasional; stopping for pregnancy    Drug use: No    Sexual activity: Yes     Partners: Male     Birth control/ protection: None      Comment: Sexually active w/      Other Topics Concern    Are You Pregnant Or Think You May Be? Yes    Breast-Feeding No     Social History Narrative    No narrative on file         Chemistry        Component Value Date/Time    NA  134 (L) 04/22/2015 1445    K 3.5 04/22/2015 1445     04/22/2015 1445    CO2 20 (L) 04/22/2015 1445    BUN 13 04/22/2015 1445    CREATININE 0.9 04/22/2015 1445    GLU 85 04/22/2015 1445        Component Value Date/Time    CALCIUM 9.0 04/22/2015 1445    ALKPHOS 42 (L) 04/22/2015 1445    AST 16 04/22/2015 1445    ALT 11 04/22/2015 1445    BILITOT 0.4 04/22/2015 1445    ESTGFRAFRICA >60.0 04/22/2015 1445    EGFRNONAA >60.0 04/22/2015 1445            Lab Results   Component Value Date    WBC 10.28 01/10/2018    HGB 9.7 (L) 01/10/2018    HCT 30.8 (L) 01/10/2018    MCV 84 01/10/2018     01/10/2018       No results for input(s): PT, INR, PROTIME, APTT in the last 72 hours.    Anesthesia Evaluation    I have reviewed the Patient Summary Reports.     I have reviewed the Medications.     Review of Systems  Anesthesia Hx:  No problems with previous Anesthesia  Neg history of prior surgery. Denies Family Hx of Anesthesia complications.   Denies Personal Hx of Anesthesia complications.   Hematology/Oncology:  Hematology Normal   Oncology Normal     Cardiovascular:  Cardiovascular Normal     Pulmonary:  Pulmonary Normal    Renal/:  Renal/ Normal     Hepatic/GI:  Hepatic/GI Normal    Neurological:  Neurology Normal    Endocrine:  Endocrine Normal    Psych:  Psychiatric Normal           Physical Exam  General:  Well nourished    Airway/Jaw/Neck:  Airway Findings: Mouth Opening: Normal Tongue: Normal  General Airway Assessment: Adult  Mallampati: I  TM Distance: Normal, at least 6 cm     Eyes/Ears/Nose:  EYES/EARS/NOSE FINDINGS: Normal   Dental:  Dental Findings: In tact   Chest/Lungs:  Chest/Lungs Findings: Normal Respiratory Rate, Clear to auscultation     Heart/Vascular:  Heart Findings: Rate: Normal        Mental Status:  Mental Status Findings:  Cooperative, Alert and Oriented         Anesthesia Plan  Type of Anesthesia, risks & benefits discussed:  Anesthesia Type:  epidural, CSE, general, MAC,  spinal  Patient's Preference:   Intra-op Monitoring Plan: standard ASA monitors  Intra-op Monitoring Plan Comments:   Post Op Pain Control Plan:   Post Op Pain Control Plan Comments:   Induction:   IV  Beta Blocker:  Patient is not currently on a Beta-Blocker (No further documentation required).       Informed Consent: Patient understands risks and agrees with Anesthesia plan.  Questions answered. Anesthesia consent signed with patient.  ASA Score: 2     Day of Surgery Review of History & Physical: I have interviewed and examined the patient. I have reviewed the patient's H&P dated:    H&P update referred to the surgeon.         Ready For Surgery From Anesthesia Perspective.

## 2018-02-06 NOTE — HPI
Jodi Daniel is a 32 y.o. [id number] at 39w4d presents for scheduled IOL. She has no complaints today. Patient reports mild contractions, denies vaginal bleeding, denies LOF.   Fetal Movement: normal.       This IUP is complicated by history of  complicated by 3rd degree lacertaion and PPH of 1000cc resulting in ABLA.     Blood Type A POS   GBBS: negative  Rubella: Immune  RPR: NR  HIV: negative  HepB: negative

## 2018-02-06 NOTE — ASSESSMENT & PLAN NOTE
- Admit to Labor and Delivery unit  - Consents for delivery including vaginal or  section and blood transfusion signed and to chart  - Risks, benefits, alternatives and possible complications have been discussed in detail with the patient.   - Epidural per Anesthesia  - Draw CBC, T&S  - Notify Staff  - Uterine irritability with irregular contractions: will give PO cytotec  - Recheck in 4 hrs or PRN    Post-Partum Hemorrhage risk - low

## 2018-02-06 NOTE — PROGRESS NOTES
"LABOR NOTE    S. Mild discomfort with epidural in place. Anesthesia called to assess.    O: BP (!) 98/51   Pulse 94   Temp 97.3 °F (36.3 °C) (Temporal)   Resp 18   Ht 5' 7" (1.702 m)   Wt 86.5 kg (190 lb 11.2 oz)   LMP 2017 (Exact Date)   SpO2 96%   Breastfeeding? No   BMI 29.87 kg/m²     FHT: 140 Cat 1 (reassuring)  CTX: q 2-3 minutes  SVE: /-2, AROM, clear; pitocin @ 4mU/min     ASSESSMENT:   32 y.o.  at 39w5d, induction of labor    FHT reassuring    Active Hospital Problems    Diagnosis  POA    *Indication for care in labor or delivery [O75.9]  Yes    Pregnancy with 39 completed weeks gestation [Z3A.39]  Not Applicable    History of postpartum hemorrhage [Z86.2]  Not Applicable    Chronic anemia [D64.9]  Yes    Third degree perineal laceration during delivery, postpartum condition or complication [O70.20]  Yes    Acute posthemorrhagic anemia [D62]  Yes      Resolved Hospital Problems    Diagnosis Date Resolved POA   No resolved problems to display.     PLAN:    Continue Close Maternal/Fetal Monitoring  2200: /-3, cytotec now, epidural when ready  0150: /-3, Will start Pitocin Augmentation per protocol, plan for AROM at next check  0340: /-2, AROM clear, pit @ 4 mU  Recheck 2 hours or PRN    Nuzhat Levy MD, PhD  OBGYN, PGY-2    "

## 2018-02-07 PROBLEM — Z86.59 HISTORY OF POSTPARTUM DEPRESSION: Status: ACTIVE | Noted: 2018-02-07

## 2018-02-07 PROBLEM — Z87.59 HISTORY OF POSTPARTUM DEPRESSION: Status: ACTIVE | Noted: 2018-02-07

## 2018-02-07 PROCEDURE — 11000001 HC ACUTE MED/SURG PRIVATE ROOM

## 2018-02-07 PROCEDURE — 25000003 PHARM REV CODE 250: Performed by: STUDENT IN AN ORGANIZED HEALTH CARE EDUCATION/TRAINING PROGRAM

## 2018-02-07 RX ORDER — IBUPROFEN 600 MG/1
600 TABLET ORAL EVERY 6 HOURS
Qty: 30 TABLET | Refills: 2 | Status: SHIPPED | OUTPATIENT
Start: 2018-02-07 | End: 2018-04-03

## 2018-02-07 RX ORDER — SERTRALINE HYDROCHLORIDE 50 MG/1
50 TABLET, FILM COATED ORAL DAILY
Qty: 30 TABLET | Refills: 6 | Status: SHIPPED | OUTPATIENT
Start: 2018-02-07 | End: 2018-03-05 | Stop reason: SDUPTHER

## 2018-02-07 RX ORDER — SERTRALINE HYDROCHLORIDE 25 MG/1
25 TABLET, FILM COATED ORAL DAILY
Qty: 7 TABLET | Refills: 0 | Status: SHIPPED | OUTPATIENT
Start: 2018-02-07 | End: 2018-02-14

## 2018-02-07 RX ADMIN — DOCUSATE SODIUM 200 MG: 100 CAPSULE, LIQUID FILLED ORAL at 09:02

## 2018-02-07 RX ADMIN — FERROUS SULFATE TAB EC 325 MG (65 MG FE EQUIVALENT) 325 MG: 325 (65 FE) TABLET DELAYED RESPONSE at 09:02

## 2018-02-07 RX ADMIN — DOCUSATE SODIUM 200 MG: 100 CAPSULE, LIQUID FILLED ORAL at 07:02

## 2018-02-07 RX ADMIN — HYDROCODONE BITARTRATE AND ACETAMINOPHEN 1 TABLET: 10; 325 TABLET ORAL at 08:02

## 2018-02-07 RX ADMIN — IBUPROFEN 600 MG: 600 TABLET, FILM COATED ORAL at 12:02

## 2018-02-07 RX ADMIN — HYDROCODONE BITARTRATE AND ACETAMINOPHEN 1 TABLET: 5; 325 TABLET ORAL at 11:02

## 2018-02-07 RX ADMIN — IBUPROFEN 600 MG: 600 TABLET, FILM COATED ORAL at 05:02

## 2018-02-07 RX ADMIN — HYDROCODONE BITARTRATE AND ACETAMINOPHEN 1 TABLET: 10; 325 TABLET ORAL at 05:02

## 2018-02-07 NOTE — SUBJECTIVE & OBJECTIVE
Hospital course: 2018 Admit to L&D for IOL. Cytotec, pitocin, epidural and AROM used for induction.  2018 - patient found to be complete, had  with no complications; second degree laceration noted.   2018 - PPD#1 s/p . Doing well this morning, meeting all postpartum goals (ambulating, pain well-controlled, tolerating reg diet without n/v, passing flatus, voiding spontaneously).     Interval History: PPD#1 s/p     She is doing well this morning. She is tolerating a regular diet without nausea or vomiting. She is voiding spontaneously. She is ambulating. She has passed flatus, and has not a BM. Vaginal bleeding is mild. She denies fever or chills. Abdominal pain is mild and controlled with oral medications. She is breastfeeding.     Objective:     Vital Signs (Most Recent):  Temp: 98.1 °F (36.7 °C) (180)  Pulse: 91 (180)  Resp: 18 (18)  BP: (!) 109/57 (18)  SpO2: 97 % (18) Vital Signs (24h Range):  Temp:  [97.9 °F (36.6 °C)-98.1 °F (36.7 °C)] 98.1 °F (36.7 °C)  Pulse:  [] 91  Resp:  [16-18] 18  SpO2:  [90 %-100 %] 97 %  BP: (108-120)/(56-69) 109/57     Weight: 86.5 kg (190 lb 11.2 oz)  Body mass index is 29.87 kg/m².      Intake/Output Summary (Last 24 hours) at 18 0643  Last data filed at 18 2350   Gross per 24 hour   Intake            89.58 ml   Output             2450 ml   Net         -2360.42 ml       Significant Labs:  Lab Results   Component Value Date    GROUPTRH A POS 2018    HEPBSAG Negative 2017    STREPBCULT No Group B Streptococcus isolated 01/10/2018       Recent Labs  Lab 18   HGB 9.0*   HCT 29.1*       I have personallly reviewed all pertinent lab results from the last 24 hours.    Physical Exam:   Constitutional: She is oriented to person, place, and time. She appears well-developed and well-nourished.    HENT:   Head: Normocephalic and atraumatic.    Eyes: EOM are normal.    Neck:  Normal range of motion.    Cardiovascular: Normal rate.     Pulmonary/Chest: Effort normal. No respiratory distress.        Abdominal: Soft. There is no tenderness. There is no rebound and no guarding.   Fundus firm below umbilicus             Musculoskeletal: Normal range of motion and moves all extremeties.       Neurological: She is alert and oriented to person, place, and time.    Skin: Skin is warm and dry.    Psychiatric: She has a normal mood and affect. Her behavior is normal. Judgment and thought content normal.

## 2018-02-07 NOTE — ASSESSMENT & PLAN NOTE
- previous delivery note suggests that PPH was secondary to laceration; no mention of uterine atony  - second degree laceration, no PPH this delivery ()

## 2018-02-07 NOTE — ANESTHESIA POSTPROCEDURE EVALUATION
"Anesthesia Post Evaluation    Patient: Jodi Daniel    Procedure(s) Performed: * No procedures listed *    Final Anesthesia Type: epidural  Patient location during evaluation: labor & delivery  Patient participation: Yes- Able to Participate  Level of consciousness: awake and alert and oriented  Post-procedure vital signs: reviewed and stable  Pain management: adequate  Airway patency: patent  PONV status at discharge: No PONV  Anesthetic complications: no      Cardiovascular status: hemodynamically stable, blood pressure returned to baseline and stable  Respiratory status: unassisted, spontaneous ventilation and room air  Hydration status: euvolemic  Follow-up not needed.        Visit Vitals  /61 (BP Location: Right arm, Patient Position: Sitting)   Pulse 89   Temp 36.9 °C (98.4 °F) (Oral)   Resp 16   Ht 5' 7" (1.702 m)   Wt 86.5 kg (190 lb 11.2 oz)   LMP 05/04/2017 (Exact Date)   SpO2 97%   Breastfeeding? No   BMI 29.87 kg/m²       Pain/Iram Score: Pain Rating Prior to Med Admin: 7 (2/7/2018  5:35 AM)  Pain Rating Post Med Admin: 2 (2/7/2018  7:10 AM)      "

## 2018-02-07 NOTE — PROGRESS NOTES
[facility]  Obstetrics  Postpartum Progress Note    Patient Name: Jodi Daniel  MRN: [id number]  Admission Date: 2018  Hospital Length of Stay: 2 days  Attending Physician: Nieves Wilson MD  Primary Care Provider: Elma Esquivel MD    Subjective:     Principal Problem:Indication for care in labor or delivery    Hospital course: 2018 Admit to L&D for IOL. Cytotec, pitocin, epidural and AROM used for induction.  2018 - patient found to be complete, had  with no complications; second degree laceration noted.   2018 - PPD#1 s/p . Doing well this morning, meeting all postpartum goals (ambulating, pain well-controlled, tolerating reg diet without n/v, passing flatus, voiding spontaneously).     Interval History: PPD#1 s/p     She is doing well this morning. She is tolerating a regular diet without nausea or vomiting. She is voiding spontaneously. She is ambulating. She has passed flatus, and has not a BM. Vaginal bleeding is mild. She denies fever or chills. Abdominal pain is mild and controlled with oral medications. She is breastfeeding.     Objective:     Vital Signs (Most Recent):  Temp: 98.1 °F (36.7 °C) (180)  Pulse: 91 (180)  Resp: 18 (18)  BP: (!) 109/57 (180)  SpO2: 97 % (18) Vital Signs (24h Range):  Temp:  [97.9 °F (36.6 °C)-98.1 °F (36.7 °C)] 98.1 °F (36.7 °C)  Pulse:  [] 91  Resp:  [16-18] 18  SpO2:  [90 %-100 %] 97 %  BP: (108-120)/(56-69) 109/57     Weight: 86.5 kg (190 lb 11.2 oz)  Body mass index is 29.87 kg/m².      Intake/Output Summary (Last 24 hours) at 18 0643  Last data filed at 18 2350   Gross per 24 hour   Intake            89.58 ml   Output             2450 ml   Net         -2360.42 ml       Significant Labs:  Lab Results   Component Value Date    GROUPTRH A POS 2018    HEPBSAG Negative 2017    STREPBCULT No Group B Streptococcus isolated 01/10/2018        Recent Labs  Lab 18   HGB 9.0*   HCT 29.1*       I have personallly reviewed all pertinent lab results from the last 24 hours.    Physical Exam:   Constitutional: She is oriented to person, place, and time. She appears well-developed and well-nourished.    HENT:   Head: Normocephalic and atraumatic.    Eyes: EOM are normal.    Neck: Normal range of motion.    Cardiovascular: Normal rate.     Pulmonary/Chest: Effort normal. No respiratory distress.        Abdominal: Soft. There is no tenderness. There is no rebound and no guarding.   Fundus firm below umbilicus             Musculoskeletal: Normal range of motion and moves all extremeties.       Neurological: She is alert and oriented to person, place, and time.    Skin: Skin is warm and dry.    Psychiatric: She has a normal mood and affect. Her behavior is normal. Judgment and thought content normal.       Assessment/Plan:     32 y.o. female  for:    * Indication for care in labor or delivery    - s/p          History of postpartum depression    - patient states she had postpartum depression with prior pregnancy requiring zoloft  - no current symptoms  - asked for zoloft to be Rxed for home         Chronic anemia    - H/h on admission 9.9/31.3 > post-delivery H/H   - Fe and colace in the post-partum period as indicated        History of postpartum hemorrhage    - likely 2/2 3rd degree laceration during prior delivery  - EBL for this delivery 150 cc  - bleeding currently stable  - will monitor closely for uterine atony and risk of repeat PPH.         Third degree perineal laceration during delivery, postpartum condition or complication    - previous delivery note suggests that PPH was secondary to laceration; no mention of uterine atony  - second degree laceration, no PPH this delivery ()            Disposition: As patient meets milestones, will plan to discharge PPD#1-2.    Sushma K Reddy, MD  Obstetrics  [facility]    Doing well, no questions,no problems.  I have reviewed the resident's note, evaluated the patient and agree with the diagnosis and management plan

## 2018-02-07 NOTE — ASSESSMENT & PLAN NOTE
- patient states she had postpartum depression with prior pregnancy requiring zoloft  - no current symptoms  - asked for zoloft to be Rxed for home

## 2018-02-07 NOTE — ASSESSMENT & PLAN NOTE
- H/h on admission 9.9/31.3 > post-delivery H/H 9/29  - Fe and colace in the post-partum period as indicated

## 2018-02-07 NOTE — LACTATION NOTE
"Seen pt for follow up care. Pt stated baby have been on the breast the entire day.  Seen earlier and discussed a plan to improve milk transfer to the baby.  Suck assessment done using finger and noted that baby looses the suck, tried suck/cheek exercises to help strengthen suckles.  Encouraged to do lots of skin to skin care.  Practice good latch chin to the breast.  Encourage to do lots of breast compression.  Taught hand expression and collected about 2 mls of colostrum and showed how to cup feed.  But baby was looking for the breast so assisted with football hold.  Pt's breastfeeding hold tends to be loose so explained the importance of a good latch. Baby have been actively nursing with lots of compression.  Pt. And SO verbalized understanding.      02/07/18 1618   Maternal Infant Assessment   Breast Shape pendulous   Breast Density soft   Areola elastic   Nipple(s) everted   Infant Assessment   Sucking Reflex present   Rooting Reflex present   LATCH Score   Latch 1-->repeated attempts, holds nipple in mouth, stimulate to suck   Audible Swallowing 1-->a few with stimulation  (during breast compression with assistance of LC)   Type Of Nipple 2-->everted (after stimulation)   Comfort (Breast/Nipple) 2-->soft/nontender   Hold (Positioning) 1-->minimal assist, teach one side: mother does other, staff holds   Score (less than 7 for 2/more consecutive times, consult Lactation Consultant) 7       Number Scale   Presence of Pain denies   Maternal Infant Feeding   Maternal Emotional State assist needed   Infant Positioning cradle;clutch/"football"   Time Spent (min) 15-30 min   Latch Assistance yes   Breastfeeding Education adequate infant intake;adequate milk volume;importance of skin-to-skin contact;milk expression, hand   Feeding Infant   Feeding Readiness Cues rooting   Feeding Tolerance/Success arousal required;sleepy;suck inconsistent   Effective Latch During Feeding yes   Skin-to-Skin Contact During Feeding yes "   Lactation Referrals   Lactation Consult Breastfeeding assessment;Follow up   Lactation Interventions   Breastfeeding Assistance assisted with positioning;support offered;feeding session observed   Maternal Breastfeeding Support lactation counseling provided   Latch Promotion suck stimulated with colostrum drop

## 2018-02-07 NOTE — PLAN OF CARE
Problem: Patient Care Overview  Goal: Plan of Care Review  Outcome: Ongoing (interventions implemented as appropriate)  To breastfeed baby 8x or more in 24 hours, feed on cue. To practice correct latch and positioning with breast compression during feeding and skin to skin during feeding.  To observe for signs of milk transfer during feeding.  To call for further breastfeeding assistance/ support if needed.  If unable to transfer milk well during feeding, hand express milk and cup feed to baby after max attempt to breastfeed.

## 2018-02-07 NOTE — LACTATION NOTE
Seen pt for lactation counseling.  Pt was nursing baby, about to finish when I went in the room.  Pt denies concerns and reports that baby nurses well.  Discussed signs of milk transfer and updated on baby's weight loss (5.3%).  Baby has wet and dirty diapers in the last 24 hours.  Recommended to do lots of skin to skin with the baby.  Baby was swaddled during feeding.  Encouraged to do lots of breast compression and if possible to practice hand expression and give expressed milk by spoon if there is latch difficulty.  Offered further assistance and instructed pt to call the next feeding.  number on the board.      02/07/18 1315   Maternal Infant Assessment   Breast Shape pendulous   Breast Density soft   Areola elastic   Nipple(s) everted   Infant Assessment   Sucking Reflex present   Pain/Comfort Assessments   Pain Assessment Performed Yes   Pain Reassessment   Pain Rating Post Med Admin 2       Number Scale   Presence of Pain denies  (Simultaneous filing. User may not have seen previous data.)   Maternal Infant Feeding   Maternal Emotional State independent   Time Spent (min) 0-15 min   Latch Assistance no   Breastfeeding Education adequate infant intake;adequate milk volume;importance of skin-to-skin contact;milk expression, hand   Lactation Referrals   Lactation Consult Follow up   Lactation Interventions   Maternal Breastfeeding Support lactation counseling provided

## 2018-02-07 NOTE — ASSESSMENT & PLAN NOTE
- likely 2/2 3rd degree laceration during prior delivery  - EBL for this delivery 150 cc  - bleeding currently stable  - will monitor closely for uterine atony and risk of repeat PPH.

## 2018-02-08 VITALS
BODY MASS INDEX: 29.93 KG/M2 | TEMPERATURE: 98 F | DIASTOLIC BLOOD PRESSURE: 57 MMHG | RESPIRATION RATE: 18 BRPM | HEART RATE: 77 BPM | HEIGHT: 67 IN | SYSTOLIC BLOOD PRESSURE: 106 MMHG | WEIGHT: 190.69 LBS | OXYGEN SATURATION: 98 %

## 2018-02-08 PROCEDURE — 25000003 PHARM REV CODE 250: Performed by: STUDENT IN AN ORGANIZED HEALTH CARE EDUCATION/TRAINING PROGRAM

## 2018-02-08 RX ADMIN — DOCUSATE SODIUM 200 MG: 100 CAPSULE, LIQUID FILLED ORAL at 09:02

## 2018-02-08 RX ADMIN — FERROUS SULFATE TAB EC 325 MG (65 MG FE EQUIVALENT) 325 MG: 325 (65 FE) TABLET DELAYED RESPONSE at 09:02

## 2018-02-08 RX ADMIN — HYDROCODONE BITARTRATE AND ACETAMINOPHEN 1 TABLET: 5; 325 TABLET ORAL at 11:02

## 2018-02-08 RX ADMIN — HYDROCODONE BITARTRATE AND ACETAMINOPHEN 1 TABLET: 10; 325 TABLET ORAL at 12:02

## 2018-02-08 RX ADMIN — IBUPROFEN 600 MG: 600 TABLET, FILM COATED ORAL at 06:02

## 2018-02-08 RX ADMIN — IBUPROFEN 600 MG: 600 TABLET, FILM COATED ORAL at 11:02

## 2018-02-08 RX ADMIN — IBUPROFEN 600 MG: 600 TABLET, FILM COATED ORAL at 12:02

## 2018-02-08 NOTE — DISCHARGE INSTRUCTIONS
Breastfeeding Discharge Instructions       Feed the baby at the earliest sign of hunger or comfort  o Hands to mouth, sucking motions  o Rooting or searching for something to suck on  o Dont wait for crying - it is a late sign of hunger and comfort.     The feedings may be 8-12 times per 24hrs and will not follow a schedule   Avoid pacifiers and bottles for the first 4 weeks   Alternate the breast you start the feeding with, or start with the breast that feels the fullest   Switch breasts when the baby takes himself off the breast or falls asleep   Keep offering breasts until the baby looks full, no longer gives hunger signs, and stays asleep when placed on his back in the crib   If the baby is sleepy and wont wake for a feeding, put the baby skin-to-skin dressed in a diaper against the mothers bare chest   Sleep near your baby   The baby should be positioned and latched on to the breast correctly  o Chest-to-chest, chin in the breast  o Babys lips are flipped outward  o Babys mouth is stretched open wide like a shout  o Babys sucking should feel like tugging to the mother  - The baby should be drinking at the breast:  o You should hear swallowing or gulping throughout the feeding  o You should see milk on the babys lips when he comes off the breast  o Your breasts should be softer when the baby is finished feeding  o The baby should look relaxed at the end of feedings  o After the 4th day and your milk is in:  o The babys poop should turn bright yellow and be loose, watery, and seedy  o The baby should have at least 3-4 poops the size of the palm of your hand per day  o The baby should have at least 6-8 wet diapers per day  o The urine should be light yellow in color  You should drink when you are thirsty and eat a healthy diet when you are    hungry.     Take naps to get the rest you need.   Take medications and/or drink alcohol only with permission of your obstetrician    or the babys  pediatrician.  You can also call the Infant Risk Center,   ([phone]), Monday-Friday, 8am-5pm Central time, to get the most   up-to-date evidence-based information on the use of medications during   pregnancy and breastfeeding.      The baby should be examined by a pediatrician at 3-5 days of age.  Once your   milk comes in, the baby should be gaining at least ½ - 1oz each day and should be back to birthweight no later than 10-14 days of age.          Community Resources    [facility] Breastfeeding Warmline: [phone]   Local [facility] clinics: provide incentives and breastpumps to eligible mothers  La Leche League International (LLLI):  mother-to-mother support group website        www.[employer].Publicate  Local La Leche League mother-to-mother support groups:        www."[employer]        La Leche League [facility]   Dr. Luciano Patiño website for latch videos and general information:        www.breastfeedinginc.ca  Infant Risk Center is a call center that provides information about the safety of taking medications while breastfeeding.  Call 1-367-243-5284, M-F, 8am-5pm, [place].  International Lactation Consultant Association provides resources for assistance:        www.ilca.org  [facility] Breastfeeding Coalition provides informationand resources for parents  and the community    http://louisianabreastfeeding.org     Marisa Ryan is a mom-to-mom support group:                             www.nocarrillonprogress.com//breastfeedng-support/  [employer] for Healthy Babies:  1-918-593-BABY(6485)  [employer]: a breastfeeding support group for women of color, [phone]

## 2018-02-08 NOTE — DISCHARGE SUMMARY
[facility]  Obstetrics  Discharge Summary      Patient Name: Jodi Daniel  MRN: [id number]  Admission Date: 2018  Hospital Length of Stay: 3 days  Discharge Date and Time:  2018 7:09 AM  Attending Physician: Nieves Wilson MD   Discharging Provider: Sushma K Reddy, MD  Primary Care Provider: Elma Esquivel MD    HPI: Jodi Daniel is a 32 y.o. [id number] at 39w4d presents for scheduled IOL. She has no complaints today. Patient reports mild contractions, denies vaginal bleeding, denies LOF.   Fetal Movement: normal.       This IUP is complicated by history of  complicated by 3rd degree lacertaion and PPH of 1000cc resulting in ABLA.     Blood Type A POS   GBBS: negative  Rubella: Immune  RPR: NR  HIV: negative  HepB: negative    * No surgery found *     Hospital Course:   2018 Admit to L&D for IOL. Cytotec, pitocin, epidural and AROM used for induction.  2018 - patient found to be complete, had  with no complications; second degree laceration noted.   2018 - PPD#1 s/p . Doing well this morning, meeting all postpartum goals (ambulating, pain well-controlled, tolerating reg diet without n/v, passing flatus, voiding spontaneously).   2018 - PPD#2 s/p . Doing well this morning, meeting all postpartum goals (ambulating, pain well-controlled, tolerating reg diet without n/v, passing flatus, voiding spontaneously). Stable for discharge home with 6 week postpartum follow-up.        Final Active Diagnoses:    Diagnosis Date Noted POA    PRINCIPAL PROBLEM:  Indication for care in labor or delivery [O75.9] 2018 Yes    History of postpartum depression [Z87.59, Z86.59] 2018 Not Applicable    Pregnancy with 39 completed weeks gestation [Z3A.39] 2018 Not Applicable    History of postpartum hemorrhage [Z86.2] 2018 Not Applicable    Chronic anemia [D64.9] 2018 Yes    Third degree perineal laceration during delivery,  postpartum condition or complication [O70.20] 01/09/2016 Yes      Problems Resolved During this Admission:    Diagnosis Date Noted Date Resolved POA    Acute posthemorrhagic anemia [D62] 01/06/2016 02/07/2018 Yes        Labs:   CBC   Recent Labs  Lab 02/06/18 2007   WBC 11.29   HGB 9.0*   HCT 29.1*          Feeding Method: breast    Immunizations     Date Immunization Status Dose Route/Site Given by    02/06/18 0818 MMR Incomplete 0.5 mL Subcutaneous/Left deltoid     02/06/18 0818 Tdap Incomplete 0.5 mL Intramuscular/Left deltoid           Delivery:    Episiotomy: None   Lacerations: 2nd   Repair suture:     Repair # of packets: 2   Blood loss (ml): 150     Birth information:  YOB: 2017   Time of birth: 7:09 AM   Sex: female   Delivery type: Vaginal, Spontaneous Delivery   Gestational Age: 39w5d    Delivery Clinician:      Other providers:       Additional  information:  Forceps:    Vacuum:    Breech:    Observed anomalies      Living?:           APGARS  One minute Five minutes Ten minutes   Skin color:         Heart rate:         Grimace:         Muscle tone:         Breathing:         Totals: 9  9        Placenta: Delivered:       appearance    Pending Diagnostic Studies:     None          Discharged Condition: good    Disposition: Home or Self Care    Follow Up:  Follow-up Information     Nieves Wilson MD. Schedule an appointment as soon as possible for a visit in 6 weeks.    Specialties:  Obstetrics, Obstetrics and Gynecology  Why:  Post-partum appointment  Contact information:  [address] [address]  [phone]                 Patient Instructions:     Call MD for:  temperature >100.4     Call MD for:  persistent nausea and vomiting or diarrhea     Call MD for:  severe uncontrolled pain     Call MD for:  difficulty breathing or increased cough     Call MD for:  severe persistent headache     Call MD for:  worsening rash     Call MD for:  persistent  dizziness, light-headedness, or visual disturbances     Call MD for:  increased confusion or weakness     Call MD for:   Order Comments: Vaginal bleeding soaking 1-2 pads per hour for 2 or more hours     Other restrictions (specify):   Order Comments: Pelvic rest until follow up visit. Nothing in vagina -no sex, tampons, douching, etc.       Medications:  Current Discharge Medication List      START taking these medications    Details   ibuprofen (ADVIL,MOTRIN) 600 MG tablet Take 1 tablet (600 mg total) by mouth every 6 (six) hours.  Qty: 30 tablet, Refills: 2      !! sertraline (ZOLOFT) 25 MG tablet Take 1 tablet (25 mg total) by mouth once daily.  Qty: 7 tablet, Refills: 0      !! sertraline (ZOLOFT) 50 MG tablet Take 1 tablet (50 mg total) by mouth once daily. BEGIN TAKING ONCE FINISHED 1 WEEK OF 25MG DAILY  Qty: 30 tablet, Refills: 6       !! - Potential duplicate medications found. Please discuss with provider.      CONTINUE these medications which have NOT CHANGED    Details   butalbital-acetaminophen-caffeine -40 mg (FIORICET, ESGIC) -40 mg per tablet              Sushma K Reddy, MD  Obstetrics  [facility]

## 2018-02-08 NOTE — LACTATION NOTE
02/08/18 0840   Maternal Infant Assessment   Breast Density filling;Bilateral:   Areola elastic;Left:   Nipple(s) everted;Left:   Nipple Symptoms tender;bilateral:   Maternal Infant Feeding   Time Spent (min) 15-30 min   Lactation Referrals   Lactation Consult Knowledge deficit   Lactation Referrals pediatric care provider;outpatient lactation program   Lactation Interventions   Attachment Promotion counseling provided;face-to-face positioning promoted;family involvement promoted;infant-mother separation minimized;privacy provided;role responsibility promoted;rooming-in promoted;skin-to-skin contact encouraged   Breastfeeding Assistance both breasts offered each feeding;feeding cue recognition promoted;milk expression/pumping;support offered   Maternal Breastfeeding Support diary/feeding log utilized;encouragement offered;lactation counseling provided;maternal hydration promoted;maternal nutrition promoted;maternal rest encouraged   Patient recently finished breastfeeding; provided lactation discharge education; reviewed Mother's Breastfeeding Guide;

## 2018-02-08 NOTE — SUBJECTIVE & OBJECTIVE
Hospital course: 2018 Admit to L&D for IOL. Cytotec, pitocin, epidural and AROM used for induction.  2018 - patient found to be complete, had  with no complications; second degree laceration noted.   2018 - PPD#1 s/p . Doing well this morning, meeting all postpartum goals (ambulating, pain well-controlled, tolerating reg diet without n/v, passing flatus, voiding spontaneously).   2018 - PPD#2 s/p . Doing well this morning, meeting all postpartum goals (ambulating, pain well-controlled, tolerating reg diet without n/v, passing flatus, voiding spontaneously). Stable for discharge home with 6 week postpartum follow-up.    Interval History: PPD#2 s/p     She is doing well this morning. She is tolerating a regular diet without nausea or vomiting. She is voiding spontaneously. She is ambulating. She has passed flatus, and has not a BM. Vaginal bleeding is mild. She denies fever or chills. Abdominal pain is mild and controlled with oral medications. She is breastfeeding.    Objective:     Vital Signs (Most Recent):  Temp: 98.1 °F (36.7 °C) (18)  Pulse: 80 (18 2343)  Resp: 18 (18)  BP: 110/64 (18)  SpO2: 98 % (18) Vital Signs (24h Range):  Temp:  [97.7 °F (36.5 °C)-98.4 °F (36.9 °C)] 98.1 °F (36.7 °C)  Pulse:  [80-89] 80  Resp:  [16-18] 18  SpO2:  [97 %-98 %] 98 %  BP: (101-110)/(61-64) 110/64     Weight: 86.5 kg (190 lb 11.2 oz)  Body mass index is 29.87 kg/m².    No intake or output data in the 24 hours ending 18 0706    Significant Labs:  Lab Results   Component Value Date    GROUPTRH A POS 2018    HEPBSAG Negative 2017    STREPBCULT No Group B Streptococcus isolated 01/10/2018       Recent Labs  Lab 18   HGB 9.0*   HCT 29.1*       I have personallly reviewed all pertinent lab results from the last 24 hours.     Physical Exam   Constitutional: She is oriented to person, place, and time. She appears  well-developed and well-nourished. No distress.   HENT:   Head: Normocephalic and atraumatic.   Eyes: EOM are normal.   Neck: Normal range of motion.   Cardiovascular: Normal rate.    Pulmonary/Chest: Effort normal. No respiratory distress.   Abdominal: Soft. She exhibits no mass. There is no tenderness. There is no guarding.   Fundus firm below umbilicus   Neurological: She is alert and oriented to person, place, and time.   Skin: Skin is warm and dry. She is not diaphoretic.   Psychiatric: She has a normal mood and affect. Her behavior is normal. Judgment and thought content normal.   Vitals reviewed.

## 2018-02-08 NOTE — PLAN OF CARE
Problem: Patient Care Overview  Goal: Plan of Care Review  Outcome: Ongoing (interventions implemented as appropriate)  Provided lactation discharge instructions;  Requested patient call lactation for latch assistance; patient will breastfeed baby on cue until content at least 8 times in 24 hours observing for signs of milk transfer; she will wake baby prn; she will avoid bottles, formula and pacifiers;

## 2018-02-08 NOTE — PROGRESS NOTES
[facility]  Obstetrics  Postpartum Progress Note    Patient Name: Jodi Daniel  MRN: [id number]  Admission Date: 2018  Hospital Length of Stay: 3 days  Attending Physician: Nieves Wilson MD  Primary Care Provider: Elma Esquivel MD    Subjective:     Principal Problem:Indication for care in labor or delivery    Hospital course: 2018 Admit to L&D for IOL. Cytotec, pitocin, epidural and AROM used for induction.  2018 - patient found to be complete, had  with no complications; second degree laceration noted.   2018 - PPD#1 s/p . Doing well this morning, meeting all postpartum goals (ambulating, pain well-controlled, tolerating reg diet without n/v, passing flatus, voiding spontaneously).   2018 - PPD#2 s/p . Doing well this morning, meeting all postpartum goals (ambulating, pain well-controlled, tolerating reg diet without n/v, passing flatus, voiding spontaneously). Stable for discharge home with 6 week postpartum follow-up.    Interval History: PPD#2 s/p     She is doing well this morning. She is tolerating a regular diet without nausea or vomiting. She is voiding spontaneously. She is ambulating. She has passed flatus, and has not a BM. Vaginal bleeding is mild. She denies fever or chills. Abdominal pain is mild and controlled with oral medications. She is breastfeeding.    Objective:     Vital Signs (Most Recent):  Temp: 98.1 °F (36.7 °C) (18 2343)  Pulse: 80 (18 2343)  Resp: 18 (18)  BP: 110/64 (183)  SpO2: 98 % (18) Vital Signs (24h Range):  Temp:  [97.7 °F (36.5 °C)-98.4 °F (36.9 °C)] 98.1 °F (36.7 °C)  Pulse:  [80-89] 80  Resp:  [16-18] 18  SpO2:  [97 %-98 %] 98 %  BP: (101-110)/(61-64) 110/64     Weight: 86.5 kg (190 lb 11.2 oz)  Body mass index is 29.87 kg/m².    No intake or output data in the 24 hours ending 18 0706    Significant Labs:  Lab Results   Component Value Date    [facility]  [facility]    HEPBSAG Negative 2017    STREPBCULT No Group B Streptococcus isolated 01/10/2018       Recent Labs  Lab 18   HGB 9.0*   HCT 29.1*       I have personallly reviewed all pertinent lab results from the last 24 hours.     Physical Exam   Constitutional: She is oriented to person, place, and time. She appears well-developed and well-nourished. No distress.   HENT:   Head: Normocephalic and atraumatic.   Eyes: EOM are normal.   Neck: Normal range of motion.   Cardiovascular: Normal rate.    Pulmonary/Chest: Effort normal. No respiratory distress.   Abdominal: Soft. She exhibits no mass. There is no tenderness. There is no guarding.   Fundus firm below umbilicus   Neurological: She is alert and oriented to person, place, and time.   Skin: Skin is warm and dry. She is not diaphoretic.   Psychiatric: She has a normal mood and affect. Her behavior is normal. Judgment and thought content normal.   Vitals reviewed.        Assessment/Plan:     32 y.o. female  for:    * Indication for care in labor or delivery    - s/p          History of postpartum depression    - patient states she had postpartum depression with prior pregnancy requiring zoloft  - no current symptoms  - asked for zoloft to be Rxed for home         Chronic anemia    - H/h on admission 9.9/31.3 > post-delivery H/H   - Fe and colace in the post-partum period as indicated        History of postpartum hemorrhage    - likely 2/2 3rd degree laceration during prior delivery  - EBL for this delivery 150 cc  - bleeding currently stable  - will monitor closely for uterine atony and risk of repeat PPH.         Third degree perineal laceration during delivery, postpartum condition or complication    - previous delivery note suggests that PPH was secondary to laceration; no mention of uterine atony  - second degree laceration, no PPH this delivery ()            Disposition: As patient is meeting milestones, will plan  to discharge PPD#2 (today).    Sushma K Reddy, MD  Obstetrics  [facility]

## 2018-02-19 ENCOUNTER — TELEPHONE (OUTPATIENT)
Dept: OBSTETRICS AND GYNECOLOGY | Facility: CLINIC | Age: 33
End: 2018-02-19

## 2018-02-19 NOTE — TELEPHONE ENCOUNTER
Patient called stating that she has started bleeding again.  Patient advised that she can have bleeding for 6-8wks after delivery. Patient verbalized understanding.

## 2018-02-19 NOTE — TELEPHONE ENCOUNTER
----- Message from Tayler Duncan sent at 2/19/2018  9:21 AM CST -----  x_  1st Request  _  2nd Request  _  3rd Request        Who: jose c    Why: pt. Stating she is experiencing vaginal pain. Please call to discuss    What Number to Call Back:[phone]    When to Expect a call back: (Before the end of the day)   -- if the call is after 12:00, the call back will be tomorrow.

## 2018-03-04 ENCOUNTER — TELEPHONE (OUTPATIENT)
Dept: LACTATION | Facility: CLINIC | Age: 33
End: 2018-03-04

## 2018-03-04 NOTE — TELEPHONE ENCOUNTER
"Lactation note: returned patient's lactation warmline call from 1558 today; spoke with patient who is not sure baby is getting enough to eat; she would like an out patient lactation consultation; baby "fussy, always breastfeeding, never content";  I&O WNL- in past 24 hours baby has had at least 8 feedings, "plenty of wet and dirty diapers" at least 5 heavy clear yellow urine diapers and 3 large, watery, seedy soft stools; patient hears baby swallowing during the feeding; patient's breasts softer pc; she uses breast compression during the feedings to facilitate milk transfer;  Next available appointment  [facility]: 3/12/2018; offered option of outpatient lactation availability at [facility] or [facility]; scheduled appointment for 3/12/2018 at [facility]; advised patient to call baby's Pediatrician tomorrow morning to discuss patient's concerns re: baby getting enough to eat; advised her to breastfeed baby on cue at least 8 times in 24 hours, observing for signs of milk transfer, using breast compression to increase milk transfer; offer second breast; offer baby EBM pc until content; use breastpump for 15 minutes per breast pc for breast stimulation, save milk to offer as supplement at next brestfeeding; support and encouragement provided;   "

## 2018-03-05 ENCOUNTER — TELEPHONE (OUTPATIENT)
Dept: OBSTETRICS AND GYNECOLOGY | Facility: CLINIC | Age: 33
End: 2018-03-05

## 2018-03-05 ENCOUNTER — PATIENT MESSAGE (OUTPATIENT)
Dept: OBSTETRICS AND GYNECOLOGY | Facility: CLINIC | Age: 33
End: 2018-03-05

## 2018-03-05 RX ORDER — SERTRALINE HYDROCHLORIDE 50 MG/1
100 TABLET, FILM COATED ORAL DAILY
Qty: 30 TABLET | Refills: 6 | Status: SHIPPED | OUTPATIENT
Start: 2018-03-05 | End: 2022-09-21

## 2018-03-05 NOTE — TELEPHONE ENCOUNTER
Patient called Lactation warm line and wanted to schedule an outpatient appointment to evaluate a breastfeeding.  She stated her baby is gaining weight, was 8#15oz at birth and at 2 weeks weighed 9#11oz.  She said she is getting lots of dirty/wet diapers but baby is always fussy.  Discussed signs of an adequate feeding, I&O, etc.  Appointment made for tomorrow 03/06/18 at 11:00.  Consult fees discussed with patient with instructions on how to get to [facility].   All questions answered, verbalizes understanding, positive reinforcement given.

## 2018-03-05 NOTE — TELEPHONE ENCOUNTER
Patient is requesting an increase in her zoloft.    I was wondering if Dr mccabe could increase my Zoloft dose. I was prescribed 50mg at hospital discharge and still not helping as much as I need like it did around week 2 and 3. Could you give her this message. Thanks

## 2018-03-06 ENCOUNTER — LACTATION CONSULT (OUTPATIENT)
Dept: OBSTETRICS AND GYNECOLOGY | Facility: CLINIC | Age: 33
End: 2018-03-06
Payer: COMMERCIAL

## 2018-03-06 NOTE — PROGRESS NOTES
[facility]    Pre/Post weight checks done.  Baby gained a total of 130 gms. During consult after breastfeeding for approx. 20 mins. on right side, and 5 mins. on left side.  Lots of praise and reassurance given to mom along with support groups and handouts on resources, calming a colicky , overactive let-down, calming a crying baby.  Discussed signs of an adequate feeding.  Mother stated she is getting lots of dirty and wet diapers.  Birth weight was 8# 15oz., baby weighed 9# 11oz at 2 weeks, and today 10# 13oz.  Discussed with her positioning, reflux precautions, burping frequently, follow up with pediatrician to discuss continued fussiness with baby.  Also discussed mother 's diet, encouraged mom to limit dairy products and chocolate and be aware of what she is eating and the reaction of the baby.  Discussed side of effects of medications mom is on. Answered all questions, positive reinforcement given, verbalizes understanding.

## 2018-03-08 ENCOUNTER — TELEPHONE (OUTPATIENT)
Dept: LACTATION | Facility: CLINIC | Age: 33
End: 2018-03-08

## 2018-03-08 NOTE — TELEPHONE ENCOUNTER
Lactation note: left message of follow up lactation call, outpatient lactation consultation appointment 3/12/2018,  and lactation warmline phone # on voice mail;

## 2018-03-09 ENCOUNTER — TELEPHONE (OUTPATIENT)
Dept: OBSTETRICS AND GYNECOLOGY | Facility: CLINIC | Age: 33
End: 2018-03-09

## 2018-03-09 NOTE — TELEPHONE ENCOUNTER
----- Message from Nicky Montgomery sent at 3/9/2018  9:32 AM CST -----  Contact: pt  _x  1st Request  _  2nd Request  _  3rd Request      Who:pt    Why: pt states that [facility]  have trying to reach the office to complete paper work,please advise     What Number to Call Back: [phone]    When to Expect a call back: (Before the end of the day)   -- if call after 3:00 call back will be tomorrow.

## 2018-03-10 ENCOUNTER — TELEPHONE (OUTPATIENT)
Dept: OBSTETRICS AND GYNECOLOGY | Facility: CLINIC | Age: 33
End: 2018-03-10

## 2018-03-12 ENCOUNTER — TELEPHONE (OUTPATIENT)
Dept: OBSTETRICS AND GYNECOLOGY | Facility: CLINIC | Age: 33
End: 2018-03-12

## 2018-03-12 NOTE — TELEPHONE ENCOUNTER
----- Message from Vonda Arciniega sent at 3/9/2018  1:45 PM CST -----  Contact: FANG RENTERIA [[id number]]  x  1st Request  _  2nd Request  _  3rd Request        Who:  FANG RENTERIA [[id number]]    Why: Requesting a call back in regards to a call she missed.   Please return the call at earliest convenience. Thanks!    What Number to Call Back: [phone]      When to Expect a call back: (Within 24 hours)

## 2018-03-12 NOTE — TELEPHONE ENCOUNTER
----- Message from Vonda Arciniega sent at 3/12/2018 11:50 AM CDT -----  Contact: FANG RENTERIA [[id number]]  x_  1st Request  _  2nd Request  _  3rd Request        Who: FANG RENTERIA [[id number]]    Why: Requesting a call back in regards to getting her FMLA paper work filled out.   Please return the call at earliest convenience. Thanks!    What Number to Call Back: [phone]      When to Expect a call back: (Within 24 hours)                            .

## 2018-03-13 ENCOUNTER — TELEPHONE (OUTPATIENT)
Dept: OBSTETRICS AND GYNECOLOGY | Facility: CLINIC | Age: 33
End: 2018-03-13

## 2018-03-13 NOTE — TELEPHONE ENCOUNTER
Called to see what records are needed by the [employer]. Patient gave contact number of [phone] and a claim number of [id number].      Paresh- [employer] rep, stated that a confirmation receipt is visible and the case will be reopened once the information is received.    Patient notified of response. Patient verbalized understanding.

## 2018-03-19 ENCOUNTER — PATIENT MESSAGE (OUTPATIENT)
Dept: OBSTETRICS AND GYNECOLOGY | Facility: CLINIC | Age: 33
End: 2018-03-19

## 2018-03-22 ENCOUNTER — PATIENT MESSAGE (OUTPATIENT)
Dept: OBSTETRICS AND GYNECOLOGY | Facility: CLINIC | Age: 33
End: 2018-03-22

## 2018-03-27 ENCOUNTER — TELEPHONE (OUTPATIENT)
Dept: OBSTETRICS AND GYNECOLOGY | Facility: CLINIC | Age: 33
End: 2018-03-27

## 2018-03-27 NOTE — TELEPHONE ENCOUNTER
Patient contacted regarding rescheduling appointment 4/3, patient will keep appointment at 315 on 4/3

## 2018-04-03 ENCOUNTER — POSTPARTUM VISIT (OUTPATIENT)
Dept: OBSTETRICS AND GYNECOLOGY | Facility: CLINIC | Age: 33
End: 2018-04-03
Payer: COMMERCIAL

## 2018-04-03 VITALS
HEIGHT: 67 IN | WEIGHT: 156.06 LBS | SYSTOLIC BLOOD PRESSURE: 110 MMHG | DIASTOLIC BLOOD PRESSURE: 80 MMHG | BODY MASS INDEX: 24.49 KG/M2

## 2018-04-03 PROCEDURE — 99999 PR PBB SHADOW E&M-EST. PATIENT-LVL III: CPT | Mod: PBBFAC,,, | Performed by: OBSTETRICS & GYNECOLOGY

## 2018-04-03 PROCEDURE — 0503F POSTPARTUM CARE VISIT: CPT | Mod: S$GLB,,, | Performed by: OBSTETRICS & GYNECOLOGY

## 2018-06-02 NOTE — PROGRESS NOTES
"CC: Post-partum follow-up    Jodi Daniel is a 32 y.o. female  who presents for post-partum visit.  She is S/P  18.   She and the baby are doing well.  No pain.  No fever.   No bowel / bladder complaints.      Delivery MD:  ERICKA Wilson MD  Breast Feeding: YES  Depression: YES  Contraception: no method      /80 (BP Location: Right arm, Patient Position: Sitting, BP Method: Small (Manual))   Ht 5' 7" (1.702 m)   Wt 70.8 kg (156 lb 1.4 oz)   LMP 2017 (Exact Date)   Breastfeeding? No   BMI 24.45 kg/m²     ROS:  GENERAL: No fever, chills, fatigability.  VULVAR: No pain, no lesions and no itching.  VAGINAL: No relaxation, no itching, no discharge, no abnormal bleeding and no lesions.  ABDOMEN: No abdominal pain. Denies nausea. Denies vomiting. No diarrhea. No constipation  BREAST: Denies pain. No lumps.  URINARY: No incontinence, no nocturia, no frequency and no dysuria.  CARDIOVASCULAR: No chest pain. No shortness of breath. No leg cramps.  NEUROLOGICAL: No headaches. No vision changes.    PHYSICAL EXAM:  Exam chaperoned by nurse  ABDOMEN:  Soft, non-tender, non-distended  VULVA:  Normal, no lesions  CERVIX:  Without lesions, polyps or tenderness.  UTERUS:  Normal size, shape, consistency, no mass or tenderness.  ADNEXA:  Normal in size without mass or tenderness    IMP:  Doing well S/P   Instructions / precautions reviewed  Contraceptive counseling    Rx  Zoloft    PLAN  May resume normal activities  Return: PRN          "

## 2018-07-26 ENCOUNTER — PATIENT MESSAGE (OUTPATIENT)
Dept: OBSTETRICS AND GYNECOLOGY | Facility: CLINIC | Age: 33
End: 2018-07-26

## 2018-07-26 RX ORDER — BUTALBITAL, ACETAMINOPHEN AND CAFFEINE 50; 325; 40 MG/1; MG/1; MG/1
1 TABLET ORAL EVERY 4 HOURS PRN
Qty: 30 TABLET | Refills: 0 | Status: SHIPPED | OUTPATIENT
Start: 2018-07-26 | End: 2018-08-25

## 2018-12-17 ENCOUNTER — TELEPHONE (OUTPATIENT)
Dept: OBSTETRICS AND GYNECOLOGY | Facility: CLINIC | Age: 33
End: 2018-12-17

## 2018-12-17 ENCOUNTER — PATIENT MESSAGE (OUTPATIENT)
Dept: OBSTETRICS AND GYNECOLOGY | Facility: CLINIC | Age: 33
End: 2018-12-17

## 2018-12-17 DIAGNOSIS — B37.9 CANDIDA INFECTION: Primary | ICD-10-CM

## 2018-12-17 RX ORDER — FLUCONAZOLE 150 MG/1
150 TABLET ORAL ONCE AS NEEDED
Qty: 2 TABLET | Refills: 2 | Status: SHIPPED | OUTPATIENT
Start: 2018-12-17 | End: 2020-05-17

## 2018-12-17 NOTE — TELEPHONE ENCOUNTER
Patient has a yeast infection from taking antibiotics and would like medication called in if possible

## 2019-01-14 PROBLEM — Z3A.39 PREGNANCY WITH 39 COMPLETED WEEKS GESTATION: Status: RESOLVED | Noted: 2018-02-05 | Resolved: 2019-01-14

## 2020-01-14 ENCOUNTER — OCCUPATIONAL HEALTH (OUTPATIENT)
Dept: URGENT CARE | Facility: CLINIC | Age: 35
End: 2020-01-14

## 2020-01-14 DIAGNOSIS — Z01.84 IMMUNITY STATUS TESTING: Primary | ICD-10-CM

## 2020-01-14 PROCEDURE — 86480 TB TEST CELL IMMUN MEASURE: CPT | Mod: S$GLB,,, | Performed by: PHYSICIAN ASSISTANT

## 2020-01-14 PROCEDURE — 86480 QUANTIFERON GOLD TB: ICD-10-PCS | Mod: S$GLB,,, | Performed by: PHYSICIAN ASSISTANT

## 2020-05-15 DIAGNOSIS — B37.9 CANDIDA INFECTION: ICD-10-CM

## 2020-05-17 RX ORDER — FLUCONAZOLE 150 MG/1
TABLET ORAL
Qty: 2 TABLET | Refills: 2 | Status: SHIPPED | OUTPATIENT
Start: 2020-05-17 | End: 2022-09-21

## 2020-07-16 ENCOUNTER — OFFICE VISIT (OUTPATIENT)
Dept: OBSTETRICS AND GYNECOLOGY | Facility: CLINIC | Age: 35
End: 2020-07-16
Payer: COMMERCIAL

## 2020-07-16 VITALS
BODY MASS INDEX: 25.15 KG/M2 | HEIGHT: 67 IN | WEIGHT: 160.25 LBS | DIASTOLIC BLOOD PRESSURE: 68 MMHG | SYSTOLIC BLOOD PRESSURE: 112 MMHG

## 2020-07-16 DIAGNOSIS — Z31.9 PROCREATIVE MANAGEMENT: ICD-10-CM

## 2020-07-16 DIAGNOSIS — Z01.419 ENCOUNTER FOR GYNECOLOGICAL EXAMINATION WITHOUT ABNORMAL FINDING: Primary | ICD-10-CM

## 2020-07-16 PROCEDURE — 99999 PR PBB SHADOW E&M-EST. PATIENT-LVL III: CPT | Mod: PBBFAC,,, | Performed by: OBSTETRICS & GYNECOLOGY

## 2020-07-16 PROCEDURE — 99395 PR PREVENTIVE VISIT,EST,18-39: ICD-10-PCS | Mod: S$GLB,,, | Performed by: OBSTETRICS & GYNECOLOGY

## 2020-07-16 PROCEDURE — 99395 PREV VISIT EST AGE 18-39: CPT | Mod: S$GLB,,, | Performed by: OBSTETRICS & GYNECOLOGY

## 2020-07-16 PROCEDURE — 88175 CYTOPATH C/V AUTO FLUID REDO: CPT

## 2020-07-16 PROCEDURE — 99999 PR PBB SHADOW E&M-EST. PATIENT-LVL III: ICD-10-PCS | Mod: PBBFAC,,, | Performed by: OBSTETRICS & GYNECOLOGY

## 2020-07-16 RX ORDER — BUPROPION HYDROCHLORIDE 150 MG/1
TABLET, EXTENDED RELEASE ORAL
COMMUNITY
Start: 2020-04-29 | End: 2022-09-21

## 2020-07-16 NOTE — PROGRESS NOTES
"CC: Well woman exam    Jodi Daniel is a 34 y.o. female  presents for a well woman exam.  She has no issues, problems, or complaints.    Past Medical History:   Diagnosis Date    Psoriasis        Past Surgical History:   Procedure Laterality Date    BREAST BIOPSY      fibroadenoma    BREAST LUMPECTOMY      SINUS SURGERY         OB History    Para Term  AB Living   2 2 2 0 0 2   SAB TAB Ectopic Multiple Live Births   0 0 0 0 2      # Outcome Date GA Lbr Tobi/2nd Weight Sex Delivery Anes PTL Lv   2 Term 18 39w5d  4.05 kg (8 lb 14.9 oz) F Vag-Spont EPI N GREGORY   1 Term 16 41w0d / 01:01 4.054 kg (8 lb 15 oz) F Vag-Spont EPI N GREGORY      Birth Comments: Hep B given 17       Family History   Problem Relation Age of Onset    Colon cancer Mother 48    Hypertension Mother     Breast cancer Maternal Aunt         MGM twin sister has breast cancer    Ovarian cancer Neg Hx     Diabetes Neg Hx     Heart attack Neg Hx     Stroke Neg Hx     Melanoma Neg Hx        Social History     Tobacco Use    Smoking status: Never Smoker    Smokeless tobacco: Never Used   Substance Use Topics    Alcohol use: No     Comment: occasional; stopping for pregnancy    Drug use: No       /68   Ht 5' 7" (1.702 m)   Wt 72.7 kg (160 lb 4.4 oz)   LMP 2020 (Approximate)   BMI 25.10 kg/m²     ROS:  GENERAL: Denies weight gain or weight loss. Feeling well overall.   SKIN: Denies rash or lesions.   HEAD: Denies head injury or headache.   NODES: Denies enlarged lymph nodes.   CHEST: Denies chest pain or shortness of breath.   CARDIOVASCULAR: Denies palpitations or left sided chest pain.   ABDOMEN: No abdominal pain, constipation, diarrhea, nausea, vomiting or rectal bleeding.   URINARY: No frequency, dysuria, hematuria, or burning on urination.  REPRODUCTIVE: See HPI.   BREASTS: The patient performs breast self-examination and denies pain, lumps, or nipple discharge.   HEMATOLOGIC: No " easy bruisability or excessive bleeding.  MUSCULOSKELETAL: Denies joint pain or swelling.   NEUROLOGIC: Denies syncope or weakness.   PSYCHIATRIC: Denies depression, anxiety or mood swings.    Physical Exam:    APPEARANCE: Well nourished, well developed, in no acute distress.  AFFECT: WNL, alert and oriented x 3  SKIN: No acne or hirsutism  NECK: Neck symmetric without masses or thyromegaly  NODES: No inguinal, cervical, axillary, or femoral lymph node enlargement  CHEST: Good respiratory effect  ABDOMEN: Soft.  No tenderness or masses.  No hepatosplenomegaly.  No hernias.  BREASTS: Symmetrical, no skin changes or visible lesions.  No palpable masses, nipple discharge bilaterally.  PELVIC: Normal external genitalia without lesions.  Normal hair distribution.  Adequate perineal body, normal urethral meatus.  Vagina moist and well rugated without lesions or discharge.  Cervix pink, without lesions, discharge or tenderness.  No significant cystocele or rectocele.  Bimanual exam shows uterus to be normal size, regular, mobile and nontender.  Adnexa without masses or tenderness.    EXTREMITIES: No edema.    ASSESSMENT AND PLAN  1. Encounter for gynecological examination without abnormal finding  Liquid-Based Pap Smear, Screening   2. Procreative management       PNV daily    Patient was counseled today on A.C.S. Pap guidelines and recommendations for yearly pelvic exams, mammograms and monthly self breast exams; to see her PCP for other health maintenance.     No follow-ups on file.

## 2020-07-30 LAB
FINAL PATHOLOGIC DIAGNOSIS: NORMAL
Lab: NORMAL

## 2021-02-23 ENCOUNTER — OCCUPATIONAL HEALTH (OUTPATIENT)
Dept: URGENT CARE | Facility: CLINIC | Age: 36
End: 2021-02-23
Payer: COMMERCIAL

## 2021-02-23 DIAGNOSIS — Z02.89 ENCOUNTER FOR PHYSICAL EXAMINATION RELATED TO EMPLOYMENT: Primary | ICD-10-CM

## 2021-02-23 PROCEDURE — 86480 TB TEST CELL IMMUN MEASURE: CPT | Mod: S$GLB,,, | Performed by: PREVENTIVE MEDICINE

## 2021-02-23 PROCEDURE — 86480 QUANTIFERON GOLD TB: ICD-10-PCS | Mod: S$GLB,,, | Performed by: PREVENTIVE MEDICINE

## 2021-02-26 ENCOUNTER — TELEPHONE (OUTPATIENT)
Dept: URGENT CARE | Facility: CLINIC | Age: 36
End: 2021-02-26

## 2022-09-14 NOTE — TELEPHONE ENCOUNTER
This patient had a CF ordered by Karlie and should not have had lads drawn for genetic testing.  Please waive the fee.  She is being presented with a large bill and she did not know it was included in her prenatal labs.  ERICKA Wilson   Billing Type: Client Bill

## 2022-09-21 ENCOUNTER — TELEPHONE (OUTPATIENT)
Dept: OBSTETRICS AND GYNECOLOGY | Facility: CLINIC | Age: 37
End: 2022-09-21
Payer: COMMERCIAL

## 2022-09-21 ENCOUNTER — OFFICE VISIT (OUTPATIENT)
Dept: OBSTETRICS AND GYNECOLOGY | Facility: CLINIC | Age: 37
End: 2022-09-21
Payer: COMMERCIAL

## 2022-09-21 VITALS
HEIGHT: 67 IN | WEIGHT: 157.44 LBS | SYSTOLIC BLOOD PRESSURE: 122 MMHG | BODY MASS INDEX: 24.71 KG/M2 | DIASTOLIC BLOOD PRESSURE: 84 MMHG

## 2022-09-21 DIAGNOSIS — R10.2 PELVIC PAIN IN FEMALE: ICD-10-CM

## 2022-09-21 DIAGNOSIS — Z12.4 ENCOUNTER FOR PAPANICOLAOU SMEAR FOR CERVICAL CANCER SCREENING: ICD-10-CM

## 2022-09-21 DIAGNOSIS — Z11.51 SCREENING FOR HPV (HUMAN PAPILLOMAVIRUS): ICD-10-CM

## 2022-09-21 DIAGNOSIS — Z01.419 WOMEN'S ANNUAL ROUTINE GYNECOLOGICAL EXAMINATION: Primary | ICD-10-CM

## 2022-09-21 PROCEDURE — 3074F PR MOST RECENT SYSTOLIC BLOOD PRESSURE < 130 MM HG: ICD-10-PCS | Mod: CPTII,S$GLB,, | Performed by: NURSE PRACTITIONER

## 2022-09-21 PROCEDURE — 3074F SYST BP LT 130 MM HG: CPT | Mod: CPTII,S$GLB,, | Performed by: NURSE PRACTITIONER

## 2022-09-21 PROCEDURE — 87624 HPV HI-RISK TYP POOLED RSLT: CPT | Performed by: NURSE PRACTITIONER

## 2022-09-21 PROCEDURE — 99999 PR PBB SHADOW E&M-EST. PATIENT-LVL III: ICD-10-PCS | Mod: PBBFAC,,, | Performed by: NURSE PRACTITIONER

## 2022-09-21 PROCEDURE — 3079F DIAST BP 80-89 MM HG: CPT | Mod: CPTII,S$GLB,, | Performed by: NURSE PRACTITIONER

## 2022-09-21 PROCEDURE — 1159F MED LIST DOCD IN RCRD: CPT | Mod: CPTII,S$GLB,, | Performed by: NURSE PRACTITIONER

## 2022-09-21 PROCEDURE — 3008F PR BODY MASS INDEX (BMI) DOCUMENTED: ICD-10-PCS | Mod: CPTII,S$GLB,, | Performed by: NURSE PRACTITIONER

## 2022-09-21 PROCEDURE — 99395 PR PREVENTIVE VISIT,EST,18-39: ICD-10-PCS | Mod: S$GLB,,, | Performed by: NURSE PRACTITIONER

## 2022-09-21 PROCEDURE — 99999 PR PBB SHADOW E&M-EST. PATIENT-LVL III: CPT | Mod: PBBFAC,,, | Performed by: NURSE PRACTITIONER

## 2022-09-21 PROCEDURE — 99395 PREV VISIT EST AGE 18-39: CPT | Mod: S$GLB,,, | Performed by: NURSE PRACTITIONER

## 2022-09-21 PROCEDURE — 1159F PR MEDICATION LIST DOCUMENTED IN MEDICAL RECORD: ICD-10-PCS | Mod: CPTII,S$GLB,, | Performed by: NURSE PRACTITIONER

## 2022-09-21 PROCEDURE — 3008F BODY MASS INDEX DOCD: CPT | Mod: CPTII,S$GLB,, | Performed by: NURSE PRACTITIONER

## 2022-09-21 PROCEDURE — 3079F PR MOST RECENT DIASTOLIC BLOOD PRESSURE 80-89 MM HG: ICD-10-PCS | Mod: CPTII,S$GLB,, | Performed by: NURSE PRACTITIONER

## 2022-09-21 PROCEDURE — 88175 CYTOPATH C/V AUTO FLUID REDO: CPT | Performed by: NURSE PRACTITIONER

## 2022-09-21 NOTE — PROGRESS NOTES
CC: Annual  HPI: Pt is a 36 y.o.  female who presents for routine annual exam. 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 Her kids and 4 and 5 yo. She is not using contraception. She does not want STD screening.  Reports some abdominal pain. UPT is negative. Denies any associated fever, dysuria or back pain. She reports occasional constipation- uses Miralax PRN.    FH:  Breast cancer: maternal great aunt  Colon cancer: mother - dx at age 48  Ovarian cancer: none  Endometrial cancer: none    ROS:  GENERAL: Feeling well overall. Denies fever or chills.   SKIN: Denies rash or lesions.   HEAD: Denies head injury or headache.   NODES: Denies enlarged lymph nodes.   CHEST: Denies chest pain or shortness of breath.   CARDIOVASCULAR: Denies palpitations or left sided chest pain.   ABDOMEN: No abdominal pain, constipation, diarrhea, nausea, vomiting or rectal bleeding.   URINARY: No dysuria, hematuria, or burning on urination.  REPRODUCTIVE: See HPI.   BREASTS: Denies pain, lumps, or nipple discharge.   HEMATOLOGIC: No easy bruisability or excessive bleeding.   MUSCULOSKELETAL: Denies joint pain or swelling.   NEUROLOGIC: Denies syncope or weakness.   PSYCHIATRIC: Denies depression, anxiety or mood swings.    PE:   APPEARANCE: Well nourished, well developed,    White  Unknown female in no acute distress.  NODES: no cervical, supraclavicular, or inguinal lymphadenopathy  BREASTS: Symmetrical, no skin changes or visible lesions. No palpable masses, nipple discharge or adenopathy bilaterally.  ABDOMEN: Soft. No tenderness or masses. No distention. No hernias palpated. No CVA tenderness.  VULVA: No lesions. Normal external female genitalia.  URETHRAL MEATUS: Normal size and location, no lesions, no prolapse.  URETHRA: No masses, tenderness, or prolapse.  VAGINA: Moist. No lesions or lacerations noted. No abnormal discharge present. No odor present.   CERVIX: No lesions or discharge. No cervical motion tenderness.   UTERUS: Normal size,  regular shape, mobile, non-tender.  ADNEXA: No tenderness. No fullness or masses palpated in the adnexal regions.   ANUS PERINEUM: Normal.      Diagnosis:  1. Women's annual routine gynecological examination    2. Encounter for Papanicolaou smear for cervical cancer screening    3. Screening for HPV (human papillomavirus)    4. Pelvic pain in female        Plan:   Pap/ HPV  Pelvic US   UPT is negative       Orders Placed This Encounter    HPV High Risk Genotypes, PCR    US Pelvis Comp with Transvag NON-OB (xpd    Liquid-Based Pap Smear, Screening       Patient was counseled today on the new ACS guidelines for cervical cytology screening as well as the current recommendations for breast cancer screening. She was counseled to follow up with her PCP for other routine health maintenance. Counseling session lasted approximately 10 minutes, and all her questions were answered.    Follow-up with me in 1 year for routine exam    Karlie Weiss, IMTIAZ-C

## 2022-09-22 ENCOUNTER — HOSPITAL ENCOUNTER (OUTPATIENT)
Dept: RADIOLOGY | Facility: HOSPITAL | Age: 37
Discharge: HOME | End: 2022-09-22
Attending: NURSE PRACTITIONER
Payer: COMMERCIAL

## 2022-09-22 DIAGNOSIS — R10.2 PELVIC PAIN IN FEMALE: ICD-10-CM

## 2022-09-22 PROCEDURE — 76856 US PELVIS COMP WITH TRANSVAG NON-OB (XPD): ICD-10-PCS | Mod: 26,,, | Performed by: RADIOLOGY

## 2022-09-22 PROCEDURE — 76830 US PELVIS COMP WITH TRANSVAG NON-OB (XPD): ICD-10-PCS | Mod: 26,,, | Performed by: RADIOLOGY

## 2022-09-22 PROCEDURE — 76830 TRANSVAGINAL US NON-OB: CPT | Mod: TC

## 2022-09-22 PROCEDURE — 76856 US EXAM PELVIC COMPLETE: CPT | Mod: 26,,, | Performed by: RADIOLOGY

## 2022-09-22 PROCEDURE — 76830 TRANSVAGINAL US NON-OB: CPT | Mod: 26,,, | Performed by: RADIOLOGY

## 2022-09-23 ENCOUNTER — PATIENT MESSAGE (OUTPATIENT)
Dept: OBSTETRICS AND GYNECOLOGY | Facility: CLINIC | Age: 37
End: 2022-09-23
Payer: COMMERCIAL

## 2022-09-29 ENCOUNTER — TELEPHONE (OUTPATIENT)
Dept: OBSTETRICS AND GYNECOLOGY | Facility: CLINIC | Age: 37
End: 2022-09-29
Payer: COMMERCIAL

## 2022-09-29 PROBLEM — R87.619 ATYPICAL CERVICAL GLANDULAR CELLS: Status: ACTIVE | Noted: 2022-09-29

## 2022-09-29 LAB
CLINICAL INFO: ABNORMAL
CYTO CVX: ABNORMAL
CYTOLOGIST CVX/VAG CYTO: ABNORMAL
CYTOLOGIST CVX/VAG CYTO: ABNORMAL
CYTOLOGY CMNT CVX/VAG CYTO-IMP: ABNORMAL
CYTOLOGY PAP THIN PREP EXPLANATION: ABNORMAL
DATE OF PREVIOUS PAP: ABNORMAL
DATE PREVIOUS BX: NO
GEN CATEG CVX/VAG CYTO-IMP: ABNORMAL
HPV I/H RISK 4 DNA CVX QL NAA+PROBE: NOT DETECTED
LMP START DATE: ABNORMAL
MICROORGANISM CVX/VAG CYTO: ABNORMAL
PATHOLOGIST CVX/VAG CYTO: ABNORMAL
SERVICE CMNT-IMP: ABNORMAL
SPECIMEN SOURCE CVX/VAG CYTO: ABNORMAL
STAT OF ADQ CVX/VAG CYTO-IMP: ABNORMAL

## 2022-10-03 ENCOUNTER — PATIENT MESSAGE (OUTPATIENT)
Dept: OBSTETRICS AND GYNECOLOGY | Facility: CLINIC | Age: 37
End: 2022-10-03
Payer: COMMERCIAL

## 2022-10-11 ENCOUNTER — PROCEDURE VISIT (OUTPATIENT)
Dept: OBSTETRICS AND GYNECOLOGY | Facility: CLINIC | Age: 37
End: 2022-10-11
Payer: COMMERCIAL

## 2022-10-11 VITALS
HEIGHT: 67 IN | DIASTOLIC BLOOD PRESSURE: 72 MMHG | WEIGHT: 162.69 LBS | BODY MASS INDEX: 25.53 KG/M2 | SYSTOLIC BLOOD PRESSURE: 118 MMHG

## 2022-10-11 DIAGNOSIS — R87.619 ATYPICAL GLANDULAR CELLS ON CERVICAL PAP SMEAR: Primary | ICD-10-CM

## 2022-10-11 PROCEDURE — 99499 NO LOS: ICD-10-PCS | Mod: S$GLB,,, | Performed by: STUDENT IN AN ORGANIZED HEALTH CARE EDUCATION/TRAINING PROGRAM

## 2022-10-11 PROCEDURE — 88305 TISSUE EXAM BY PATHOLOGIST: CPT | Mod: 59 | Performed by: PATHOLOGY

## 2022-10-11 PROCEDURE — 99499 UNLISTED E&M SERVICE: CPT | Mod: S$GLB,,, | Performed by: STUDENT IN AN ORGANIZED HEALTH CARE EDUCATION/TRAINING PROGRAM

## 2022-10-11 PROCEDURE — 57454 PR COLPOSC,CERVIX W/ADJ VAG,W/BX & CURRETAG: ICD-10-PCS | Mod: S$GLB,,, | Performed by: STUDENT IN AN ORGANIZED HEALTH CARE EDUCATION/TRAINING PROGRAM

## 2022-10-11 PROCEDURE — 88305 TISSUE EXAM BY PATHOLOGIST: ICD-10-PCS | Mod: 26,,, | Performed by: PATHOLOGY

## 2022-10-11 PROCEDURE — 88305 TISSUE EXAM BY PATHOLOGIST: CPT | Mod: 26,,, | Performed by: PATHOLOGY

## 2022-10-11 PROCEDURE — 57454 BX/CURETT OF CERVIX W/SCOPE: CPT | Mod: S$GLB,,, | Performed by: STUDENT IN AN ORGANIZED HEALTH CARE EDUCATION/TRAINING PROGRAM

## 2022-10-11 NOTE — PROCEDURES
Endometrial Biopsy- Today    Date/Time: 10/11/2022 2:30 PM  Performed by: Rosario Dee MD  Authorized by: Rosario Dee MD     Consent:     Consent obtained:  Written    Consent given by:  Patient    Patient questions answered: yes      Patient agrees, verbalizes understanding, and wants to proceed: yes    Indication:     Indications: ASHTYN    Pre-procedure:     Pre-procedure timeout performed: yes    Procedure:     Procedure: endometrial biopsy with Pipelle      A paracervical block was performed: no      Uterus sounded: yes      Uterus sound depth (cm):  7    Specimen collected: specimen collected and sent to pathology      Patient tolerated procedure well with no complications: yes      DATE: 10/11/2022     PROCEDURE: Endometrial biopsy     INDICATION: AGC     PATIENT CONSENT: written     PRE ENDOMETRIAL BIOPSY COUNSELING:  The patient was informed of the risk of bleeding, infection, uterine perforation and  pain and that the test will rule-out endometrial cancer with accuracy greater than 95%. She was counseled on the alternatives to endometrial biopsy. All of her questions were answered. Patient gives written consent     ANESTHESIA: None     Labs: UPT performed prior to the procedure was negative.     PROCEDURE NOTE: The cervix was visualized with a speculum and colposcopy was performed. See separate procedure note for details. A sterile endometrial pipelle was inserted into the uterus to a sound length of 7 cm. 1 pass was made with the pipelle and moderate amount of tissue was obtained. The specimen was placed in formalin and sent to Pathology for evaluation.      COMPLICATIONS: None     DISPOSITION: The patient tolerated the above procedure well.        POST ENDOMETRIAL BIOPSY COUNSELING:  - Manage post biopsy cramping with NSAIDs or Tylenol.  - Expect spotting or light bleeding for a few days.  - Report bleeding heavier than a period, fever > 101.0 F, worsening pain or a foul smelling vaginal  discharge.      Rosario Dee MD

## 2022-10-11 NOTE — PROCEDURES
Colposcopy-Today    Date/Time: 10/11/2022 2:30 PM  Performed by: Rosario Dee MD  Authorized by: Rosario Dee MD     Consent Done?:  Yes (Written)  Assistants?: Yes    List of assistants:  Nakita Alonso    Colposcopy Site:  Cervix, Vagina and Vulva  Position:  Supine   Patient was prepped and draped in the normal sterile fashion.  Acrowhite Lesion: No    Atypical Vessels? Yes    Transformation Zone Adequate?: Yes    Biopsy?: Yes         Location:  Cervix ((8 00))  ECC Performed?: Yes    LEEP Performed?: No    Estimated blood loss (cc):  5   Patient tolerated the procedure well with no immediate complications.   Post-operative instructions were provided for the patient.   Patient was discharged and will follow up if any complications occur    COLPOSCOPY PROCEDURE NOTE:  10/11/2022     Jodi Daniel is a 36 y.o. female   presents for colposcopy.  Patient's last menstrual period was 2022 (exact date)..  Her most recent pap smear shows glandular cell abnormality (ASHTYN). HPV was negative.    The abnormal test findings were discussed, as well as HPV infection, need for colposcopy and possible biopsies to determine the plan of care, treatments available, the minimal risk of bleeding and infection with colposcopy, and alternatives to colposcopy and she agrees to proceed. Written consents signed. UPT is negative.    COLPOSCOPY EXAM:     Timeout performed     The vulva and vagina were grossly inspected and found to be normal.    no visible lesions and abnormal vessels noted at 10-12 o'clock  Squamocolumnar junction visualized: Yes.  Biopsy was taken at 10 o'clock.  ECC was performed.    Hemostasis was adequate with application of silver nitrate  The speculum was removed.  The patient did tolerate the procedure well.    All collected specimens sent to pathology for histologic analysis.    Colposcopic impression: low-grade.    Post-colposcopy counseling:  The patient was instructed to manage  post-colposcopy cramping with NSAIDs or Tylenol, or with a prescription per the medication card.  Avoid intercourse, douching, or tampons in the vagina for at least 2-3 days.  Expect a clumpy blackish discharge due to Monsel's solution application for several days.  Report heavy bleeding, worsening pain or pain that does not respond to above medications, or foul-smelling vaginal discharge. HPV vaccine recommended according to FDA age guidelines.  Importance of follow-up stressed.      Follow up based on colposcopy results.    Rosario Dee

## 2022-10-14 LAB
FINAL PATHOLOGIC DIAGNOSIS: NORMAL
GROSS: NORMAL
Lab: NORMAL

## 2023-01-10 ENCOUNTER — PATIENT MESSAGE (OUTPATIENT)
Dept: OBSTETRICS AND GYNECOLOGY | Facility: CLINIC | Age: 38
End: 2023-01-10
Payer: COMMERCIAL

## 2023-01-10 RX ORDER — FLUCONAZOLE 150 MG/1
TABLET ORAL
Qty: 2 TABLET | Refills: 0 | Status: SHIPPED | OUTPATIENT
Start: 2023-01-10

## 2023-09-13 ENCOUNTER — PATIENT MESSAGE (OUTPATIENT)
Dept: OBSTETRICS AND GYNECOLOGY | Facility: CLINIC | Age: 38
End: 2023-09-13
Payer: COMMERCIAL

## 2024-06-26 ENCOUNTER — PATIENT MESSAGE (OUTPATIENT)
Dept: OBSTETRICS AND GYNECOLOGY | Facility: CLINIC | Age: 39
End: 2024-06-26